# Patient Record
Sex: MALE | Race: WHITE | NOT HISPANIC OR LATINO
[De-identification: names, ages, dates, MRNs, and addresses within clinical notes are randomized per-mention and may not be internally consistent; named-entity substitution may affect disease eponyms.]

---

## 2017-01-27 ENCOUNTER — MEDICATION RENEWAL (OUTPATIENT)
Age: 82
End: 2017-01-27

## 2017-02-23 ENCOUNTER — MEDICATION RENEWAL (OUTPATIENT)
Age: 82
End: 2017-02-23

## 2017-04-22 ENCOUNTER — EMERGENCY (EMERGENCY)
Facility: HOSPITAL | Age: 82
LOS: 1 days | Discharge: HOME | End: 2017-04-22
Attending: EMERGENCY MEDICINE | Admitting: EMERGENCY MEDICINE
Payer: MEDICARE

## 2017-04-22 VITALS
RESPIRATION RATE: 16 BRPM | DIASTOLIC BLOOD PRESSURE: 70 MMHG | OXYGEN SATURATION: 100 % | TEMPERATURE: 98 F | HEART RATE: 81 BPM | SYSTOLIC BLOOD PRESSURE: 140 MMHG

## 2017-04-22 VITALS
DIASTOLIC BLOOD PRESSURE: 97 MMHG | TEMPERATURE: 98 F | OXYGEN SATURATION: 97 % | HEART RATE: 108 BPM | RESPIRATION RATE: 18 BRPM | SYSTOLIC BLOOD PRESSURE: 162 MMHG

## 2017-04-22 DIAGNOSIS — R33.9 RETENTION OF URINE, UNSPECIFIED: ICD-10-CM

## 2017-04-22 LAB
ALBUMIN SERPL ELPH-MCNC: 3.9 G/DL — SIGNIFICANT CHANGE UP (ref 3.3–5)
ALP SERPL-CCNC: 71 U/L — SIGNIFICANT CHANGE UP (ref 40–120)
ALT FLD-CCNC: 9 U/L RC — LOW (ref 10–45)
ANION GAP SERPL CALC-SCNC: 11 MMOL/L — SIGNIFICANT CHANGE UP (ref 5–17)
APPEARANCE UR: CLEAR — SIGNIFICANT CHANGE UP
AST SERPL-CCNC: 17 U/L — SIGNIFICANT CHANGE UP (ref 10–40)
BASOPHILS # BLD AUTO: 0 K/UL — SIGNIFICANT CHANGE UP (ref 0–0.2)
BASOPHILS NFR BLD AUTO: 0.2 % — SIGNIFICANT CHANGE UP (ref 0–2)
BILIRUB SERPL-MCNC: 0.6 MG/DL — SIGNIFICANT CHANGE UP (ref 0.2–1.2)
BILIRUB UR-MCNC: NEGATIVE — SIGNIFICANT CHANGE UP
BUN SERPL-MCNC: 20 MG/DL — SIGNIFICANT CHANGE UP (ref 7–23)
CALCIUM SERPL-MCNC: 9.4 MG/DL — SIGNIFICANT CHANGE UP (ref 8.4–10.5)
CHLORIDE SERPL-SCNC: 94 MMOL/L — LOW (ref 96–108)
CO2 SERPL-SCNC: 26 MMOL/L — SIGNIFICANT CHANGE UP (ref 22–31)
COLOR SPEC: COLORLESS — SIGNIFICANT CHANGE UP
CREAT SERPL-MCNC: 0.87 MG/DL — SIGNIFICANT CHANGE UP (ref 0.5–1.3)
DIFF PNL FLD: ABNORMAL
EOSINOPHIL # BLD AUTO: 0.2 K/UL — SIGNIFICANT CHANGE UP (ref 0–0.5)
EOSINOPHIL NFR BLD AUTO: 3.6 % — SIGNIFICANT CHANGE UP (ref 0–6)
EPI CELLS # UR: SIGNIFICANT CHANGE UP /HPF
GLUCOSE SERPL-MCNC: 100 MG/DL — HIGH (ref 70–99)
GLUCOSE UR QL: NEGATIVE — SIGNIFICANT CHANGE UP
HCT VFR BLD CALC: 34.3 % — LOW (ref 39–50)
HGB BLD-MCNC: 11.2 G/DL — LOW (ref 13–17)
KETONES UR-MCNC: NEGATIVE — SIGNIFICANT CHANGE UP
LEUKOCYTE ESTERASE UR-ACNC: ABNORMAL
LYMPHOCYTES # BLD AUTO: 1.4 K/UL — SIGNIFICANT CHANGE UP (ref 1–3.3)
LYMPHOCYTES # BLD AUTO: 25.2 % — SIGNIFICANT CHANGE UP (ref 13–44)
MCHC RBC-ENTMCNC: 29.5 PG — SIGNIFICANT CHANGE UP (ref 27–34)
MCHC RBC-ENTMCNC: 32.6 GM/DL — SIGNIFICANT CHANGE UP (ref 32–36)
MCV RBC AUTO: 90.5 FL — SIGNIFICANT CHANGE UP (ref 80–100)
MONOCYTES # BLD AUTO: 0.5 K/UL — SIGNIFICANT CHANGE UP (ref 0–0.9)
MONOCYTES NFR BLD AUTO: 8.7 % — SIGNIFICANT CHANGE UP (ref 2–14)
NEUTROPHILS # BLD AUTO: 3.4 K/UL — SIGNIFICANT CHANGE UP (ref 1.8–7.4)
NEUTROPHILS NFR BLD AUTO: 62.3 % — SIGNIFICANT CHANGE UP (ref 43–77)
NITRITE UR-MCNC: NEGATIVE — SIGNIFICANT CHANGE UP
PH UR: 7 — SIGNIFICANT CHANGE UP (ref 5–8)
PLATELET # BLD AUTO: 171 K/UL — SIGNIFICANT CHANGE UP (ref 150–400)
POTASSIUM SERPL-MCNC: 3.8 MMOL/L — SIGNIFICANT CHANGE UP (ref 3.5–5.3)
POTASSIUM SERPL-SCNC: 3.8 MMOL/L — SIGNIFICANT CHANGE UP (ref 3.5–5.3)
PROT SERPL-MCNC: 6.7 G/DL — SIGNIFICANT CHANGE UP (ref 6–8.3)
PROT UR-MCNC: NEGATIVE — SIGNIFICANT CHANGE UP
RBC # BLD: 3.79 M/UL — LOW (ref 4.2–5.8)
RBC # FLD: 11.9 % — SIGNIFICANT CHANGE UP (ref 10.3–14.5)
RBC CASTS # UR COMP ASSIST: SIGNIFICANT CHANGE UP /HPF (ref 0–2)
SODIUM SERPL-SCNC: 131 MMOL/L — LOW (ref 135–145)
SP GR SPEC: 1.01 — LOW (ref 1.01–1.02)
UROBILINOGEN FLD QL: NEGATIVE — SIGNIFICANT CHANGE UP
WBC # BLD: 5.4 K/UL — SIGNIFICANT CHANGE UP (ref 3.8–10.5)
WBC # FLD AUTO: 5.4 K/UL — SIGNIFICANT CHANGE UP (ref 3.8–10.5)
WBC UR QL: SIGNIFICANT CHANGE UP /HPF (ref 0–5)

## 2017-04-22 PROCEDURE — 81001 URINALYSIS AUTO W/SCOPE: CPT

## 2017-04-22 PROCEDURE — 99284 EMERGENCY DEPT VISIT MOD MDM: CPT | Mod: GC

## 2017-04-22 PROCEDURE — 80053 COMPREHEN METABOLIC PANEL: CPT

## 2017-04-22 PROCEDURE — 99284 EMERGENCY DEPT VISIT MOD MDM: CPT | Mod: 25

## 2017-04-22 PROCEDURE — 85027 COMPLETE CBC AUTOMATED: CPT

## 2017-04-22 RX ORDER — SODIUM CHLORIDE 9 MG/ML
500 INJECTION INTRAMUSCULAR; INTRAVENOUS; SUBCUTANEOUS ONCE
Qty: 0 | Refills: 0 | Status: COMPLETED | OUTPATIENT
Start: 2017-04-22 | End: 2017-04-22

## 2017-04-22 RX ADMIN — SODIUM CHLORIDE 500 MILLILITER(S): 9 INJECTION INTRAMUSCULAR; INTRAVENOUS; SUBCUTANEOUS at 06:54

## 2017-04-22 NOTE — ED PROVIDER NOTE - OBJECTIVE STATEMENT
89 year old, history of BPH. PMH reconciled in PMH section, presenting with 1 day of frequent urination. stating he cant stop urinating.     urologist: monique  pmd: azam gurrola 89 year old, history of BPH. PMH reconciled in PMH section, presenting with 1 day of frequent urination. stating he cant stop urinating. denies pain in abdomen    urologist: monique  pmd: azam gurrola 89 year old, history of BPH. PMH reconciled in PMH section, presenting with 1 day of frequent urination. stating he cant stop urinating. denies pain in abdomen. no n/v/d. no fever.    urologist: monique  pmd: azam gurrola

## 2017-04-22 NOTE — ED PROVIDER NOTE - NS ED ROS FT
ROS: No fever/chills, no eye pain, no throat pain, no chest pain, no shortness of breath, constipation, no abdominal pain,  no dysuria, no muscle pain, no rashes, no focal neurologic complaints, no known mental health issues

## 2017-04-22 NOTE — ED ADULT NURSE NOTE - PSH
Adult Hypothyroidism    CAD (Coronary Artery Disease)    Coronary Stent    History of Cholecystectomy    HTN (Hypertension)    S/P Laparoscopic Cholecystectomy

## 2017-04-22 NOTE — ED PROVIDER NOTE - MEDICAL DECISION MAKING DETAILS
89 y F p/w urinary frequency: abd soft, NTND. no cva ttp. Afebrile. UA (+) for ealry UTI will tx w/ po abx. HELEN.

## 2017-04-22 NOTE — ED PROVIDER NOTE - PHYSICAL EXAMINATION
Jayden: A & O x 3, NAD, HEENT WNL and no facial asymmetry; lungs CTAB, heart with reg rhythm without murmur; abdomen with firm suprapubic mass nontender, extremities with no edema; skin with no rashes, neuro exam non focal with no motor or sensory deficits

## 2017-04-22 NOTE — ED ADULT NURSE REASSESSMENT NOTE - NS ED NURSE REASSESS COMMENT FT1
Pt d/c contreras change to leg bag Draining clear yellow urine Denies pain D/c instructions given to family Na 131 family aware to f/u with PMD

## 2017-04-22 NOTE — ED ADULT NURSE REASSESSMENT NOTE - NS ED NURSE REASSESS COMMENT FT1
Received awake alert Hard of hearing Daughters at bedside [place] draining clear yellow urine 1100cc

## 2017-04-22 NOTE — ED ADULT NURSE NOTE - OBJECTIVE STATEMENT
Patient presented to ED ambulatory w/family c/o urinary frequency, patient denies pain and urinary retention, patient is incontinent of urine and very [place]. Md Middleton did an U/S of the bladder and showed bladder full w/urine. Ordered a contreras catheter, inserted by me (Lisa Ireland) w/out difficulty, under strict sterile technique. Contreras catheter drained right away about 600 cc clear light yellow urine.

## 2017-04-22 NOTE — ED ADULT NURSE REASSESSMENT NOTE - NS ED NURSE REASSESS COMMENT FT1
0630 am Patient BP decreased after 1100 cc urine output, Md Middleton made aware, IVF bolus initiated as ordered. Closely monitoring.

## 2017-05-01 ENCOUNTER — MEDICATION RENEWAL (OUTPATIENT)
Age: 82
End: 2017-05-01

## 2017-05-10 ENCOUNTER — APPOINTMENT (OUTPATIENT)
Dept: CARDIOLOGY | Facility: CLINIC | Age: 82
End: 2017-05-10

## 2017-05-10 ENCOUNTER — NON-APPOINTMENT (OUTPATIENT)
Age: 82
End: 2017-05-10

## 2017-05-10 VITALS
TEMPERATURE: 97.5 F | DIASTOLIC BLOOD PRESSURE: 75 MMHG | HEIGHT: 66 IN | BODY MASS INDEX: 22.5 KG/M2 | HEART RATE: 80 BPM | OXYGEN SATURATION: 100 % | SYSTOLIC BLOOD PRESSURE: 136 MMHG | WEIGHT: 140 LBS

## 2017-05-10 DIAGNOSIS — I35.0 NONRHEUMATIC AORTIC (VALVE) STENOSIS: ICD-10-CM

## 2017-05-10 DIAGNOSIS — I25.10 ATHEROSCLEROTIC HEART DISEASE OF NATIVE CORONARY ARTERY W/OUT ANGINA PECTORIS: ICD-10-CM

## 2017-05-10 DIAGNOSIS — I10 ESSENTIAL (PRIMARY) HYPERTENSION: ICD-10-CM

## 2017-05-10 DIAGNOSIS — Z86.19 PERSONAL HISTORY OF OTHER INFECTIOUS AND PARASITIC DISEASES: ICD-10-CM

## 2017-05-10 DIAGNOSIS — E78.00 PURE HYPERCHOLESTEROLEMIA, UNSPECIFIED: ICD-10-CM

## 2017-05-25 ENCOUNTER — MEDICATION RENEWAL (OUTPATIENT)
Age: 82
End: 2017-05-25

## 2017-05-29 ENCOUNTER — RX RENEWAL (OUTPATIENT)
Age: 82
End: 2017-05-29

## 2017-06-19 DIAGNOSIS — R35.0 FREQUENCY OF MICTURITION: ICD-10-CM

## 2017-06-22 ENCOUNTER — INPATIENT (INPATIENT)
Facility: HOSPITAL | Age: 82
LOS: 7 days | Discharge: NURSING, REHAB OR LONG-TERM CARE | DRG: 884 | End: 2017-06-30
Attending: HOSPITALIST | Admitting: HOSPITALIST
Payer: MEDICARE

## 2017-06-22 VITALS
DIASTOLIC BLOOD PRESSURE: 65 MMHG | TEMPERATURE: 98 F | RESPIRATION RATE: 16 BRPM | SYSTOLIC BLOOD PRESSURE: 111 MMHG | HEART RATE: 110 BPM | OXYGEN SATURATION: 100 %

## 2017-06-22 DIAGNOSIS — R31.9 HEMATURIA, UNSPECIFIED: ICD-10-CM

## 2017-06-22 DIAGNOSIS — E03.9 HYPOTHYROIDISM, UNSPECIFIED: ICD-10-CM

## 2017-06-22 DIAGNOSIS — R41.0 DISORIENTATION, UNSPECIFIED: ICD-10-CM

## 2017-06-22 DIAGNOSIS — Z29.9 ENCOUNTER FOR PROPHYLACTIC MEASURES, UNSPECIFIED: ICD-10-CM

## 2017-06-22 DIAGNOSIS — N40.0 BENIGN PROSTATIC HYPERPLASIA WITHOUT LOWER URINARY TRACT SYMPTOMS: ICD-10-CM

## 2017-06-22 DIAGNOSIS — I10 ESSENTIAL (PRIMARY) HYPERTENSION: ICD-10-CM

## 2017-06-22 DIAGNOSIS — F03.90 UNSPECIFIED DEMENTIA, UNSPECIFIED SEVERITY, WITHOUT BEHAVIORAL DISTURBANCE, PSYCHOTIC DISTURBANCE, MOOD DISTURBANCE, AND ANXIETY: ICD-10-CM

## 2017-06-22 DIAGNOSIS — I35.0 NONRHEUMATIC AORTIC (VALVE) STENOSIS: ICD-10-CM

## 2017-06-22 DIAGNOSIS — T83.511D INFECTION AND INFLAMMATORY REACTION DUE TO INDWELLING URETHRAL CATHETER, SUBSEQUENT ENCOUNTER: ICD-10-CM

## 2017-06-22 LAB
ALBUMIN SERPL ELPH-MCNC: 3.5 G/DL — SIGNIFICANT CHANGE UP (ref 3.3–5)
ALP SERPL-CCNC: 55 U/L — SIGNIFICANT CHANGE UP (ref 40–120)
ALT FLD-CCNC: 9 U/L RC — LOW (ref 10–45)
ANION GAP SERPL CALC-SCNC: 12 MMOL/L — SIGNIFICANT CHANGE UP (ref 5–17)
APPEARANCE UR: ABNORMAL
AST SERPL-CCNC: 18 U/L — SIGNIFICANT CHANGE UP (ref 10–40)
BASOPHILS # BLD AUTO: 0 K/UL — SIGNIFICANT CHANGE UP (ref 0–0.2)
BASOPHILS NFR BLD AUTO: 0.5 % — SIGNIFICANT CHANGE UP (ref 0–2)
BILIRUB SERPL-MCNC: 0.7 MG/DL — SIGNIFICANT CHANGE UP (ref 0.2–1.2)
BILIRUB UR-MCNC: NEGATIVE — SIGNIFICANT CHANGE UP
BUN SERPL-MCNC: 17 MG/DL — SIGNIFICANT CHANGE UP (ref 7–23)
CALCIUM SERPL-MCNC: 9.7 MG/DL — SIGNIFICANT CHANGE UP (ref 8.4–10.5)
CHLORIDE SERPL-SCNC: 95 MMOL/L — LOW (ref 96–108)
CO2 SERPL-SCNC: 26 MMOL/L — SIGNIFICANT CHANGE UP (ref 22–31)
COLOR SPEC: ABNORMAL
CREAT SERPL-MCNC: 0.91 MG/DL — SIGNIFICANT CHANGE UP (ref 0.5–1.3)
DIFF PNL FLD: ABNORMAL
EOSINOPHIL # BLD AUTO: 0.1 K/UL — SIGNIFICANT CHANGE UP (ref 0–0.5)
EOSINOPHIL NFR BLD AUTO: 1.4 % — SIGNIFICANT CHANGE UP (ref 0–6)
GAS PNL BLDV: SIGNIFICANT CHANGE UP
GLUCOSE SERPL-MCNC: 136 MG/DL — HIGH (ref 70–99)
GLUCOSE UR QL: NEGATIVE — SIGNIFICANT CHANGE UP
HCT VFR BLD CALC: 32.1 % — LOW (ref 39–50)
HGB BLD-MCNC: 11.2 G/DL — LOW (ref 13–17)
KETONES UR-MCNC: NEGATIVE — SIGNIFICANT CHANGE UP
LEUKOCYTE ESTERASE UR-ACNC: ABNORMAL
LYMPHOCYTES # BLD AUTO: 1.4 K/UL — SIGNIFICANT CHANGE UP (ref 1–3.3)
LYMPHOCYTES # BLD AUTO: 21.6 % — SIGNIFICANT CHANGE UP (ref 13–44)
MAGNESIUM SERPL-MCNC: 2.1 MG/DL — SIGNIFICANT CHANGE UP (ref 1.6–2.6)
MCHC RBC-ENTMCNC: 33 PG — SIGNIFICANT CHANGE UP (ref 27–34)
MCHC RBC-ENTMCNC: 35 GM/DL — SIGNIFICANT CHANGE UP (ref 32–36)
MCV RBC AUTO: 94.3 FL — SIGNIFICANT CHANGE UP (ref 80–100)
MONOCYTES # BLD AUTO: 0.4 K/UL — SIGNIFICANT CHANGE UP (ref 0–0.9)
MONOCYTES NFR BLD AUTO: 6.4 % — SIGNIFICANT CHANGE UP (ref 2–14)
NEUTROPHILS # BLD AUTO: 4.6 K/UL — SIGNIFICANT CHANGE UP (ref 1.8–7.4)
NEUTROPHILS NFR BLD AUTO: 70.1 % — SIGNIFICANT CHANGE UP (ref 43–77)
NITRITE UR-MCNC: NEGATIVE — SIGNIFICANT CHANGE UP
PH UR: 7.5 — SIGNIFICANT CHANGE UP (ref 5–8)
PHOSPHATE SERPL-MCNC: 3.2 MG/DL — SIGNIFICANT CHANGE UP (ref 2.5–4.5)
PLATELET # BLD AUTO: 145 K/UL — LOW (ref 150–400)
POTASSIUM SERPL-MCNC: 4.2 MMOL/L — SIGNIFICANT CHANGE UP (ref 3.5–5.3)
POTASSIUM SERPL-SCNC: 4.2 MMOL/L — SIGNIFICANT CHANGE UP (ref 3.5–5.3)
PROT SERPL-MCNC: 6.6 G/DL — SIGNIFICANT CHANGE UP (ref 6–8.3)
PROT UR-MCNC: 300 MG/DL
RBC # BLD: 3.41 M/UL — LOW (ref 4.2–5.8)
RBC # FLD: 12.5 % — SIGNIFICANT CHANGE UP (ref 10.3–14.5)
RBC CASTS # UR COMP ASSIST: >50 /HPF (ref 0–2)
SODIUM SERPL-SCNC: 133 MMOL/L — LOW (ref 135–145)
SP GR SPEC: 1.01 — SIGNIFICANT CHANGE UP (ref 1.01–1.02)
UROBILINOGEN FLD QL: NEGATIVE — SIGNIFICANT CHANGE UP
WBC # BLD: 6.5 K/UL — SIGNIFICANT CHANGE UP (ref 3.8–10.5)
WBC # FLD AUTO: 6.5 K/UL — SIGNIFICANT CHANGE UP (ref 3.8–10.5)
WBC UR QL: SIGNIFICANT CHANGE UP /HPF (ref 0–5)

## 2017-06-22 PROCEDURE — 99285 EMERGENCY DEPT VISIT HI MDM: CPT | Mod: GC

## 2017-06-22 PROCEDURE — 93010 ELECTROCARDIOGRAM REPORT: CPT

## 2017-06-22 PROCEDURE — 71010: CPT | Mod: 26

## 2017-06-22 PROCEDURE — 70450 CT HEAD/BRAIN W/O DYE: CPT | Mod: 26

## 2017-06-22 PROCEDURE — 99222 1ST HOSP IP/OBS MODERATE 55: CPT | Mod: GC

## 2017-06-22 PROCEDURE — 99223 1ST HOSP IP/OBS HIGH 75: CPT | Mod: GC

## 2017-06-22 RX ORDER — FINASTERIDE 5 MG/1
1 TABLET, FILM COATED ORAL
Qty: 0 | Refills: 0 | COMMUNITY

## 2017-06-22 RX ORDER — CEFTRIAXONE 500 MG/1
1 INJECTION, POWDER, FOR SOLUTION INTRAMUSCULAR; INTRAVENOUS ONCE
Qty: 0 | Refills: 0 | Status: COMPLETED | OUTPATIENT
Start: 2017-06-22 | End: 2017-06-22

## 2017-06-22 RX ORDER — LEVOTHYROXINE SODIUM 125 MCG
75 TABLET ORAL DAILY
Qty: 0 | Refills: 0 | Status: DISCONTINUED | OUTPATIENT
Start: 2017-06-22 | End: 2017-06-30

## 2017-06-22 RX ORDER — TAMSULOSIN HYDROCHLORIDE 0.4 MG/1
0.4 CAPSULE ORAL AT BEDTIME
Qty: 0 | Refills: 0 | Status: DISCONTINUED | OUTPATIENT
Start: 2017-06-22 | End: 2017-06-30

## 2017-06-22 RX ORDER — HALOPERIDOL DECANOATE 100 MG/ML
0.25 INJECTION INTRAMUSCULAR
Qty: 0 | Refills: 0 | Status: DISCONTINUED | OUTPATIENT
Start: 2017-06-22 | End: 2017-06-30

## 2017-06-22 RX ORDER — HALOPERIDOL DECANOATE 100 MG/ML
0.12 INJECTION INTRAMUSCULAR ONCE
Qty: 0 | Refills: 0 | Status: COMPLETED | OUTPATIENT
Start: 2017-06-22 | End: 2017-06-22

## 2017-06-22 RX ORDER — ASPIRIN/CALCIUM CARB/MAGNESIUM 324 MG
81 TABLET ORAL DAILY
Qty: 0 | Refills: 0 | Status: DISCONTINUED | OUTPATIENT
Start: 2017-06-22 | End: 2017-06-22

## 2017-06-22 RX ORDER — TAMSULOSIN HYDROCHLORIDE 0.4 MG/1
1 CAPSULE ORAL
Qty: 0 | Refills: 0 | COMMUNITY

## 2017-06-22 RX ORDER — HALOPERIDOL DECANOATE 100 MG/ML
0.12 INJECTION INTRAMUSCULAR EVERY 6 HOURS
Qty: 0 | Refills: 0 | Status: DISCONTINUED | OUTPATIENT
Start: 2017-06-22 | End: 2017-06-22

## 2017-06-22 RX ORDER — SIMVASTATIN 20 MG/1
20 TABLET, FILM COATED ORAL AT BEDTIME
Qty: 0 | Refills: 0 | Status: DISCONTINUED | OUTPATIENT
Start: 2017-06-22 | End: 2017-06-30

## 2017-06-22 RX ORDER — ASPIRIN/CALCIUM CARB/MAGNESIUM 324 MG
81 TABLET ORAL DAILY
Qty: 0 | Refills: 0 | Status: DISCONTINUED | OUTPATIENT
Start: 2017-06-22 | End: 2017-06-30

## 2017-06-22 RX ORDER — LEVOTHYROXINE SODIUM 125 MCG
1 TABLET ORAL
Qty: 0 | Refills: 0 | COMMUNITY

## 2017-06-22 RX ORDER — FINASTERIDE 5 MG/1
5 TABLET, FILM COATED ORAL DAILY
Qty: 0 | Refills: 0 | Status: DISCONTINUED | OUTPATIENT
Start: 2017-06-22 | End: 2017-06-30

## 2017-06-22 RX ORDER — CEFTRIAXONE 500 MG/1
1 INJECTION, POWDER, FOR SOLUTION INTRAMUSCULAR; INTRAVENOUS EVERY 24 HOURS
Qty: 0 | Refills: 0 | Status: DISCONTINUED | OUTPATIENT
Start: 2017-06-22 | End: 2017-06-24

## 2017-06-22 RX ORDER — ASPIRIN/CALCIUM CARB/MAGNESIUM 324 MG
1 TABLET ORAL
Qty: 0 | Refills: 0 | COMMUNITY

## 2017-06-22 RX ORDER — HALOPERIDOL DECANOATE 100 MG/ML
0.12 INJECTION INTRAMUSCULAR EVERY 6 HOURS
Qty: 0 | Refills: 0 | Status: DISCONTINUED | OUTPATIENT
Start: 2017-06-22 | End: 2017-06-27

## 2017-06-22 RX ADMIN — SIMVASTATIN 20 MILLIGRAM(S): 20 TABLET, FILM COATED ORAL at 20:57

## 2017-06-22 RX ADMIN — TAMSULOSIN HYDROCHLORIDE 0.4 MILLIGRAM(S): 0.4 CAPSULE ORAL at 20:57

## 2017-06-22 RX ADMIN — HALOPERIDOL DECANOATE 0.12 MILLIGRAM(S): 100 INJECTION INTRAMUSCULAR at 20:50

## 2017-06-22 RX ADMIN — HALOPERIDOL DECANOATE 0.12 MILLIGRAM(S): 100 INJECTION INTRAMUSCULAR at 19:06

## 2017-06-22 RX ADMIN — CEFTRIAXONE 100 GRAM(S): 500 INJECTION, POWDER, FOR SOLUTION INTRAMUSCULAR; INTRAVENOUS at 13:10

## 2017-06-22 RX ADMIN — Medication 81 MILLIGRAM(S): at 20:58

## 2017-06-22 NOTE — BEHAVIORAL HEALTH ASSESSMENT NOTE - NSBHCHARTREVIEWIMAGING_PSY_A_CORE FT
EXAM:  CT BRAIN                          PROCEDURE DATE:  06/22/2017      INTERPRETATION:  CLINICAL INFORMATION: Altered mental status    TECHNIQUE: Noncontrast axial CT images were acquired through the head.   Two-dimensional sagittal and coronal reformats were generated.    COMPARISON STUDY: CT brain dated June 6, 2011.    FINDINGS:     There is no CT evidence of acute intracranial hemorrhage, extra-axial   collection, vasogenic edema, mass effect, midline shift, central   herniation,or hydrocephalus.    Prominent ventricles and sulci secondary to parenchymal volume loss. The   ventricles and sulci have increased since the prior examination 6 years   earlier. Scattered white matter hypodensity compatible with chronic   microvascular-type change.    The visualized paranasal sinuses are clear. The mastoid air cells and   middle ear cavities are grossly clear.    The soft tissues of the scalp and face are unremarkable.    The bones of the calvarium, skull base, and face are unremarkable.    There has been bilateral cataract surgery.    IMPRESSION:     No CT evidence of acute intracranial hemorrhage or mass effect. Atrophy   and small vessel white matter ischemic changes. Atrophy has moderately   progressed since 6/6/2011.

## 2017-06-22 NOTE — BEHAVIORAL HEALTH ASSESSMENT NOTE - NSBHCHARTREVIEWLAB_PSY_A_CORE FT
CBC Full  -  ( 2017 09:15 )  WBC Count : 6.5 K/uL  Hemoglobin : 11.2 g/dL  Hematocrit : 32.1 %  Platelet Count - Automated : 145 K/uL  Mean Cell Volume : 94.3 fl  Mean Cell Hemoglobin : 33.0 pg  Mean Cell Hemoglobin Concentration : 35.0 gm/dL  Auto Neutrophil # : 4.6 K/uL  Auto Lymphocyte # : 1.4 K/uL  Auto Monocyte # : 0.4 K/uL  Auto Eosinophil # : 0.1 K/uL  Auto Basophil # : 0.0 K/uL  Auto Neutrophil % : 70.1 %  Auto Lymphocyte % : 21.6 %  Auto Monocyte % : 6.4 %  Auto Eosinophil % : 1.4 %  Auto Basophil % : 0.5 %        133<L>  |  95<L>  |  17  ----------------------------<  136<H>  4.2   |  26  |  0.91    Ca    9.7      2017 09:15  Phos  3.2       Mg     2.1         TPro  6.6  /  Alb  3.5  /  TBili  0.7  /  DBili  x   /  AST  18  /  ALT  9<L>  /  AlkPhos  55      Urinalysis Basic - ( 2017 11:37 )    Color: Red / Appearance: Turbid / S.010 / pH: x  Gluc: x / Ketone: Negative  / Bili: Negative / Urobili: Negative   Blood: x / Protein: 300 mg/dL / Nitrite: Negative   Leuk Esterase: Small / RBC: >50 /HPF / WBC 11-25 /HPF   Sq Epi: x / Non Sq Epi: x / Bacteria: x

## 2017-06-22 NOTE — ED PROVIDER NOTE - ATTENDING CONTRIBUTION TO CARE
89 y/o m with pmhx AS, dementia, presents for concern of rapid decline in MS. lost 9 lbs over 8 weeks and last night pulled his contreras cath out. family takes turns caring for him. no fever no chills. not eating as much.   Gen. no acute distress, Non toxic   HEENT: EOMI, mmm,   Lungs: CTAB/L no C/ W /R   CVS: S1S2   Abd; Soft non tender, non distended + periumbilical hernia. reducible and non tender.  Ext: no edema    dried blood at glans penis. no tenderness. no clots or lesions  Neuro AAOx3 non focal clear speech

## 2017-06-22 NOTE — ED PROVIDER NOTE - OBJECTIVE STATEMENT
89 y/o male with 89 y/o male with PMH of aortic stenosis, mild dementia recently placed contreras on 6/20 by Dr. Lynn pulled out his contreras this morning. Patient had it placed 2/2 to BPH. Patient is on flomax. Denies any fevers, chills, chest pain, SOB, N/V/D. At baseline, patient is able to walk with a walker. Patient lives at home with daughter. Family is concerned that "something else is going on" b/c they feel that he has become more confused recently (over past few days).  Also report a 10 lb weight loss recently.     PMD: Dr. Gorman 89 y/o male with PMH of aortic stenosis, mild dementia recently placed contreras on 6/20 by Dr. Lynn pulled out his contreras this morning. Patient had it placed 2/2 to BPH. Patient is on flomax. Denies any fevers, chills, chest pain, SOB, N/V/D. At baseline, patient is able to walk with a walker. Patient lives at home with daughter. Family is concerned that "something else is going on" b/c they feel that he has become more confused and forgetful recently.  Also report a 10 lb weight loss recently. Family states that this all occurred over past week or so.    PMD: Dr. Gorman

## 2017-06-22 NOTE — BEHAVIORAL HEALTH ASSESSMENT NOTE - CASE SUMMARY
This is a 89-y.o. CM pt. with multiple medical co-morbidities and unspecified dementia, who came in with AMS in the context of UTI, his presentation indicative of delirium. I agree with fellow's assessment and plan.

## 2017-06-22 NOTE — ED ADULT NURSE NOTE - OBJECTIVE STATEMENT
89 yo presents to the ED from home reports that he pulled his contreras catheter out this morning. pt family states that he had a contreras catheter placed 2 days ago by urologist due to enlarged prostate. pt takes flomax daily. pt denies fever, chills, N/V/D, SOB, cp. pt has history of UTI. at baseline, pt lives at home with daughter, able to walk with a walker. pt family states he has onset of dementia. pt family states that it has been worse the past few days. pt family states that in the past 9 weeks he lost 10 pounds. 89 yo presents to the ED from home reports that he pulled his contreras catheter out this morning. pt family states that he had a contreras catheter placed 2 days ago by urologist due to enlarged prostate. pt takes flomax daily. pt denies fever, chills, N/V/D, SOB, cp. pt has history of UTI. at baseline, pt lives at home with daughter, able to walk with a walker. pt family states he has onset of dementia. pt family states that it has been worse the past few days. pt family states that in the past 9 weeks he lost 10 pounds. family states that there is a "safety concern with him being home since he needs 24 hour care that they can not provide". pt family states that pt has been stating that he wants to die the past few weeks. pt has red blanchable sore on his buttock area. safety and comfort measure maintained. 89 yo presents to the ED from home reports that he pulled his contreras catheter out this morning. pt family states that he had a contreras catheter placed 2 days ago by urologist due to enlarged prostate. pt takes flomax daily. pt denies fever, chills, N/V/D, SOB, cp. pt has history of UTI. at baseline, pt lives at home with daughter, able to walk with a walker. pt family states he has recent onset of dementia with altered mental status the past few weeks. pt family states that it has been worse the past few days. pt family states that in the past 9 weeks he lost 10 pounds. family states that there is a "safety concern with him being home since he needs 24 hour care that they can not provide". pt family states that pt has been stating that he wants to die the past few weeks. pt has red blanchable sore on his buttock area. pt appears comfortable, NAD noted. safety and comfort measure maintained.

## 2017-06-22 NOTE — H&P ADULT - HISTORY OF PRESENT ILLNESS
89 M symptomatic AS (not operative candidate), HTN, HLD, hypothyroid, GERD, BPH, dementia, and prior UTI, p/w acute agitation and hematuria s/p pulling contreras out.  Pt lives with daughter Tiffanie and at baseline has dementia, with noted gradual decline over last few months.  About 1 month ago, had urinary sx, seen in ED for retention, had contreras placed and f/u with Uro, dx with UTI, and rxed bactrim, to which pt became "loopy" on.  After tx, doing ok, until about 2 days ago, where he had contreras cath placed by Uro for BPH/retention.  During this time, pt noted to have acute agitation above baseline; this morning found with contreras cath removed and blood at meatus.  At baseline, pt has had progressive decline of cognition over last few months, with sx starting few months after his wife passed away in 2009.  Still ambulatory with walker, lives with daughter, and is cared for equally by 3 siblings. 89 M symptomatic AS (not operative candidate), HTN, HLD, hypothyroid, GERD, BPH, dementia, and prior UTI, p/w acute agitation and hematuria s/p pulling contreras out.  Pt lives with daughter Tiffanie and at baseline has dementia, with noted gradual decline over last few months.  About 1 month ago, had urinary sx, seen in ED for retention, had contreras placed and f/u with Uro, dx with UTI, and rxed bactrim, to which pt became "loopy" on. Pt also had episode of diarrhea x few days, resolved since last 2-3 days s/p immodium.  After tx, doing ok, until about 2 days ago, where he had contreras cath placed by Uro for BPH/retention.  During this time, pt noted to have acute agitation above baseline; this morning found with contreras cath removed and blood at meatus.   Noted to have weight loss over last few months, no night sweats.  Denies CP/SOB/f/c, denies abd pain, denies back pain, unclear if having urinary sx.   At baseline, pt has had progressive decline of cognition over last few months, with sx starting few months after his wife passed away in 2009.  Still ambulatory with walker, lives with daughter, and is cared for equally by 3 siblings. Extensive GOC d/w family in ER; pt has endorsed in will in the past that he wants to be DNR/DNI; family has dutifully taken care of him at home but believes that after this current hospitalization, they may need to place pt in nursing home.      In ED: VS: 97.5, 110, 111/65, 16, 100% RA.  Labs: no leukocytosis, chronic stable anemia (hgb 11.2), chronic stable mild hyponatremia (133), UA+.  CTH without acute changes but atrophy and white matter ischemic disease seen, progressed since 2011. CXR with clear lungs. Given ceftriaxone in ED. 89 M symptomatic AS (not operative candidate), HTN, HLD, hypothyroid, GERD, BPH, dementia, and prior UTI, p/w acute agitation and hematuria s/p pulling contreras out.  Pt lives with daughter Tiffanie and at baseline has dementia, with noted gradual decline over last few months.  About 1 month ago, had urinary sx, seen in ED for retention, had contreras placed and f/u with Uro, dx with UTI, and rxed bactrim, to which pt became "loopy" on. Pt also had episode of diarrhea x few days, resolved since last 2-3 days s/p immodium.  After tx, doing ok, until about 2 days ago, where he had contreras cath placed by Uro for BPH/retention.  During this time, pt noted to have acute agitation above baseline; this morning found with contreras cath removed and blood at meatus.   Noted to have weight loss over last few months, no night sweats.  Denies CP/SOB/f/c, denies abd pain, denies back pain, unclear if having urinary sx.   At baseline, pt has had progressive decline of cognition over last few months, with sx starting few months after his wife passed away in 2009.  Still ambulatory with walker, lives with daughter, and is cared for equally by 3 siblings. Extensive GOC d/w family in ER; pt has endorsed in will in the past that he wants to be DNR/DNI; family has dutifully taken care of him at home but believes that after this current hospitalization, they may need to place pt in nursing home.      In ED: VS: 97.5, 110, 111/65, 16, 100% RA.  Labs: no leukocytosis, chronic stable anemia (hgb 11.2), chronic stable mild hyponatremia (133), UA+.  CTH without acute changes but atrophy and white matter ischemic disease seen, progressed since 2011. CXR with clear lungs. Given ceftriaxone in ED.  Seen by uro, contreras re-placed in ER, 400 cc red urine out. Pt endorsing wish to "kill himself" 2/2 hospitalization and issues with contreras; seen by behavioral health in ED; noted with No prior psychiatric history, no h/o suicidality. No h/o substance abuse, behavior thought 2/2 delirium in setting of UTI. 89M w/ PMHx of symptomatic AS (not operative candidate), HTN, HLD, Hypothyroid, GERD, BPH, Dementia (baseline MS ____), and prior UTI, p/w acute agitation and hematuria s/p pulling contreras out.  Pt lives with daughter Tiffanie and at baseline has dementia, with noted gradual decline over last few months.  About 1 month ago, had urinary sx, seen in ED for retention, had Contreras placed and f/u with Uro, dx with UTI, and treated with Bactrim, to which pt became "loopy" on. Pt also had episode of diarrhea x few days, resolved since last 2-3 days s/p immodium.  After tx, doing ok, until about 2 days ago, where he had Contreras cath placed by Uro for BPH/retention.  During this time, pt noted to have acute agitation above baseline; this morning found with contreras cath removed and blood at meatus.   Noted to have weight loss over last few months, no night sweats.  Denies CP/SOB/f/c, denies abd pain, denies back pain, unclear if having urinary sx.          At baseline, pt has had progressive decline of cognition over last few months, with sx starting few months after his wife passed away in 2009.  Still ambulatory with walker, lives with daughter, and is cared for equally by 3 siblings. Extensive GOC d/w family in ER; pt has endorsed in will in the past that he wants to be DNR/DNI; family has dutifully taken care of him at home but believes that after this current hospitalization, they may need to place pt in nursing home.      ED Vitals: 97.5, 110, 111/65, 16, 100% RA.     Labs: no leukocytosis, chronic stable anemia (hgb 11.2), chronic stable mild hyponatremia (133), UA+.  CTH without acute changes but atrophy and white matter ischemic disease seen, progressed since 2011. CXR with clear lungs. Given ceftriaxone in ED.  Seen by Urology, Contreras re-placed in ER, 400 cc red urine out. Pt endorsing wish to "kill himself" 2/2 hospitalization and issues with contreras; seen by Behavioral Health in ED; noted with no prior psychiatric history, no h/o suicidality. No h/o substance abuse, behavior thought 2/2 delirium in setting of UTI. 89M w/ PMHx of symptomatic AS (not operative candidate), HTN, HLD, CAD (s/p PCI), Hypothyroid, GERD, BPH, Dementia (baseline MS unknown), and prior UTI, p/w acute agitation and hematuria s/p pulling contreras out.  Pt lives with daughter Tiffanie and at baseline has dementia, with noted gradual decline over last few months.  About 1 month ago, had urinary sx, seen in ED for retention, had Contreras placed and f/u with Uro, dx with UTI, and treated with Bactrim, to which pt became "loopy" on. Pt also had episode of diarrhea x few days, resolved since last 2-3 days s/p immodium.  After tx, doing ok, until about 2 days ago, where he had Contreras cath placed by Uro for BPH/retention.  During this time, pt noted to have acute agitation above baseline; this morning found with contreras cath removed and blood at meatus.   Noted to have weight loss over last few months, no night sweats.  Denies CP/SOB/f/c, denies abd pain, denies back pain, unclear if having urinary sx.          At baseline, pt has had progressive decline of cognition over last few months, with sx starting few months after his wife passed away in 2009.  Still ambulatory with walker, lives with daughter, and is cared for equally by 3 siblings. Extensive GOC d/w family in ER; pt has endorsed in will in the past that he wants to be DNR/DNI; family has dutifully taken care of him at home but believes that after this current hospitalization, they may need to place pt in nursing home.      ED Vitals: 97.5, 110, 111/65, 16, 100% RA.     Labs: no leukocytosis, chronic stable anemia (hgb 11.2), chronic stable mild hyponatremia (133), UA+.  CTH without acute changes but atrophy and white matter ischemic disease seen, progressed since 2011. CXR with clear lungs. Given ceftriaxone in ED.  Seen by Urology, Contreras re-placed in ER, 400 cc red urine out. Pt endorsing wish to "kill himself" 2/2 hospitalization and issues with contreras; seen by Behavioral Health in ED; noted with no prior psychiatric history, no h/o suicidality. No h/o substance abuse, behavior thought 2/2 delirium in setting of UTI. 89M w/ PMHx of symptomatic AS (not operative candidate), HTN, HLD, CAD (s/p PCI), Hypothyroid, GERD, BPH, Dementia (baseline MS unknown), and prior UTI, p/w acute agitation and hematuria s/p pulling contreras out.  Pt lives with daughter Tiffanie and at baseline has dementia, with noted gradual decline over last few months.  About 1 month ago, had urinary sx, seen in ED for retention, had Contreras placed and f/u with Uro, dx with UTI, and treated with Bactrim, to which pt became "loopy" on. Pt also had episode of diarrhea x few days, resolved since last 2-3 days s/p immodium.  After tx, doing ok, until about 2 days ago, where he had Contreras cath placed by Uro for BPH/retention.  During this time, pt noted to have acute agitation above baseline; this morning found with contreras cath removed and blood at meatus.   Noted to have weight loss over last few months, no night sweats.  Denies CP/SOB/f/c, denies abd pain, denies back pain, unclear if having urinary sx.          At baseline, pt has had progressive decline of cognition over last few months, with sx starting few months after his wife passed away in 2009.  Still ambulatory with walker, lives with daughter, and is cared for equally by 3 siblings. Extensive GOC d/w family in ER; pt has endorsed in will in the past that he wants to be DNR/DNI; family has dutifully taken care of him at home but believes that after this current hospitalization, they may need to place pt in nursing home.      ED Vitals: 97.5, 110, 111/65, 16, 100% RA.     Labs: no leukocytosis, chronic stable anemia (hgb 11.2), chronic stable mild hyponatremia (133), UA+.  CTH without acute changes but atrophy and white matter ischemic disease seen, progressed since 2011. CXR with clear lungs. Given ceftriaxone in ED.  Seen by Urology, Contreras re-placed in ER, 400 cc red urine out. Pt endorsing wish to "kill himself" 2/2 hospitalization and issues with contreras; seen by Behavioral Health in ED; noted with no prior psychiatric history, no h/o suicidality. No h/o substance abuse, behavior thought 2/2 delirium in setting of UTI.     Note: Med rec performed by me

## 2017-06-22 NOTE — CHART NOTE - NSCHARTNOTEFT_GEN_A_CORE
Medicine Night Float: Event Note    Event: agitation. Provider contacted for ongoing agitation after Haldol 0.125mg x 2 earlier this evening, last dose 20:00. I discussed the case with the admitting attending earlier this evening with plan to call psychiatry if pt is inadequately controlled. I called to discuss the case with psychiatry resident on call who recommended giving up to Haldol 1mg overnight.     Assessment/Plan: Give next prn Haldol 0.125 mg dose early now. This will be a total of 0.375mg Haldol. If ongoing agitation, will give additional doses. Pt is scheduled for 0.25 mg po dose in the AM.     T(C): 37.1, Max: 37.1 (06-22 @ 21:00)  T(F): 98.8  HR: 94 (52 - 110)  BP: 127/85 (98/55 - 153/90)  BP(mean): --  RR: 16 (15 - 18)  SpO2: 97% (97% - 100%)  Wt(kg): --    Malachi Petty  R1, Medicine, Night Float  144

## 2017-06-22 NOTE — H&P ADULT - PROBLEM SELECTOR PLAN 4
-progressive decline  -GOC in ER, proxy Tiffanie has livia DNR/DNI  -will sign out to day team to f/u with ; family amenable to placement in long term facility upon d/c -c/w flomax, finasteride  -c/w ben, urology following -c/w Flomax and Finasteride  -c/w Sheyla, Urology following

## 2017-06-22 NOTE — H&P ADULT - PROBLEM SELECTOR PLAN 6
-c/w synthroid -no s/s of overt heart failure at this time, no significant edema on exam  -pt no longer on diuretics as outpatient  -Pt off of all antihypertensives or mark agents as o/p 2/2 soft pressures  -not candidate for surgery  -currently SBP <120 so no need for diuretics or ace-i -no s/s of overt heart failure at this time, no significant edema on exam  -pt no longer on diuretics as outpatient  -Pt off of all antihypertensives or mark agents as o/p 2/2 soft pressures  -not candidate for surgery  -currently SBP <120 so no need for diuretics or ace-i  -pt on aspirin and statin, presumable for cad; would d/c both given age and risk/benefit of both -no s/s of overt heart failure at this time, no significant edema on exam  -pt no longer on diuretics as outpatient  -not candidate for surgery  -currently SBP <120 so no need for diuretics or ace-i  -pt on ASA and statin, would d/w outpatient Cardiology risks vs benefits of continuing both - given h/o CAD w/ PCI will continue both for now

## 2017-06-22 NOTE — H&P ADULT - NSHPLABSRESULTS_GEN_ALL_CORE
Labs reviewed by me:  no leukocytosis, chronic stable anemia (hgb 11.2), chronic stable mild hyponatremia (133), UA+.     Imaging reviewed by me,  CTH without acute changes but atrophy and white matter ischemic disease seen, progressed since 2011. CXR with clear lungs. Labs reviewed by me:  no leukocytosis, chronic stable anemia (hgb 11.2 @ baseline), chronic stable mild hyponatremia (133), UA+ (WBC:11-25, LE:small, Nitrite:neg), UCx pending    Imaging: Personally reviewed: CTH without acute changes but atrophy and white matter ischemic disease seen, progressed since 2011. CXR: clear lungs.    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

## 2017-06-22 NOTE — H&P ADULT - PROBLEM SELECTOR PLAN 5
-no s/s of overt heart failure at this time, no significant edema on exam  -pt no longer on diuretics as outpatient  -Pt off of all antihypertensives or mark agents as o/p 2/2 soft pressures  -not candidate for surgery -no s/s of overt heart failure at this time, no significant edema on exam  -pt no longer on diuretics as outpatient  -Pt off of all antihypertensives or mark agents as o/p 2/2 soft pressures  -not candidate for surgery  -currently SBP <120 so no need for diuretics or ace-i -progressive decline  -GOC in ER, proxy Tiffanie has livia DNR/DNI  -will sign out to day team to f/u with ; family amenable to placement in long term facility upon d/c -check TSH, B12, HIV, RPR in am  -GOC in ER, proxy Tiffanie has signed DNR/DNI  -will sign out to day team to f/u with ; family amenable to placement in long term facility upon d/c

## 2017-06-22 NOTE — BEHAVIORAL HEALTH ASSESSMENT NOTE - HPI (INCLUDE ILLNESS QUALITY, SEVERITY, DURATION, TIMING, CONTEXT, MODIFYING FACTORS, ASSOCIATED SIGNS AND SYMPTOMS)
Patient is a 89 y/o with multiple medical co-morbidities and unspecified dementia, who came in with AMS and pulling out Carrion's.  Patient seen at bedside, orientedx1, he cannot respond to most questions just repeating "I am tired", "I want to go home". Family at bedside reports he cannot hear without hearing aid and he refuses to wear it.   As per the 3 children at bedside, he has been having some mild dementia for a while, worsened over 6 months. He was very confused for past couple of days, is more agitated and difficult to manage. Son found him after he had pulled out his Carrion's last night.  No prior psychiatric history, no h/o suicidality. No h/o substance abuse.  h/o Aortic stenosis, UTI  Retired [place] , , 5 children, lives with one of the daughters  No legal history  No significant family history

## 2017-06-22 NOTE — ED PROVIDER NOTE - CONSTITUTIONAL, MLM
normal... Well appearing, well nourished, awake, alert, oriented to person, place. Hard of hearing. Thin but does not appear toxic

## 2017-06-22 NOTE — BEHAVIORAL HEALTH ASSESSMENT NOTE - SUMMARY
89 y/o male with PMH of aortic stenosis, mild dementia recently placed contreras on 6/20 by Dr. Lynn pulled out his contreras this morning. Patient had it placed 2/2 to BPH. Patient is on flomax. Denies any fevers, chills, chest pain, SOB, N/V/D. At baseline, patient is able to walk with a walker. Patient lives at home with daughter.  Prince has some underlying dementia per history and now delirious secondary to UTI. No h/o SI/HI/hallucinations or any psychiatric issues in the past. Patient randomly saying "I want to kill myself" for past couple of days, likely due to AMS secondary to delirium.    Family reports he is more difficult to manage lately and they would like to place him in a facility.  Discussed the options of medications for treatment of delirium, counseled regarding risks and benefits including black box warning for increased risk of mortality with antipsychotics in elderly patients, family verbalized understanding and agrees with the plan.

## 2017-06-22 NOTE — H&P ADULT - NSHPSOCIALHISTORY_GEN_ALL_CORE
Social History:    Marital Status:  (   )    (   ) Single    (   )    (  x)   Occupation:   Lives with: (  ) alone  ( x ) children   (  ) spouse   (  ) parents  (  ) other    Substance Use (street drugs): ( x ) never used  (  ) other:  Tobacco Usage:  (   ) never smoked   ( x  ) former smoker   (   ) current smoker  (     ) pack year  (        ) last cigarette date  Alcohol Usage:none Social History:    Marital Status:  (   )    (   ) Single    (   )    (  x)   Occupation: retired , dress maker.   Lives with: (  ) alone  ( x ) children   (  ) spouse   (  ) parents  (  ) other    Substance Use (street drugs): ( x ) never used  (  ) other:  Tobacco Usage:  (   ) never smoked   ( x  ) former smoker   (   ) current smoker  (     ) pack year  (        ) last cigarette date  Alcohol Usage:none

## 2017-06-22 NOTE — ED PROVIDER NOTE - GASTROINTESTINAL, MLM
Abdomen soft, non-tender, no guarding. R sided abdominal lipoma. + bowel sounds. Abdomen soft, non-tender, no guarding. R sided abdominal lipoma. + bowel sounds. FOBT negative

## 2017-06-22 NOTE — BEHAVIORAL HEALTH ASSESSMENT NOTE - RISK ASSESSMENT
89 y/o male with PMH of aortic stenosis, mild dementia brought in due to AMS, worsening agitation.   Patient has no psychiatric history. Suicidal statements more indicative of delirious state. On no psych meds.   Patient is at risk for inadvertent harm to self due to pulling out Carrion, getting out of bed etc.secondary to delirium

## 2017-06-22 NOTE — BEHAVIORAL HEALTH ASSESSMENT NOTE - NSBHCONSULTRECOMMENDOTHER_PSY_A_CORE FT
EKG: Antipsychotics can be given if QTc < 500    Avoid benzos, opioids and anticholinergics if possible as it may worsen confusion  Maintain sleep wake cycle  Provide frequent reorientation and redirection  Family member at bedside if possible  Assess for need for hearing aid (if applicable)

## 2017-06-22 NOTE — PROGRESS NOTE ADULT - SUBJECTIVE AND OBJECTIVE BOX
Urology called regarding patient pulling contreras out prior to arrival. 18F coude contreras placed using sterile technique. 400cc's of clear red urine drained. Pt tolerated procedure well.   -Follow up with Dr. Lynn in office.

## 2017-06-22 NOTE — H&P ADULT - ASSESSMENT
89 M symptomatic AS (not operative candidate), HTN, HLD, hypothyroid, GERD, BPH, dementia, and prior UTI, p/w acute agitation and hematuria s/p pulling contreras out in setting of chronic progressive dementia. 89M w/ PMHx of symptomatic severe AS (not operative candidate), HTN, HLD, CAD (s/p PCI), Hypothyroid, GERD, BPH, Dementia (unknown baseline, currently AOx1), and prior UTI, p/w acute agitation and hematuria after pulling Carrion out in setting of chronic progressive dementia. Suspect metabolic encephalopathy 2/2 complicated UTI (patient not septic).

## 2017-06-22 NOTE — ED PROVIDER NOTE - PROGRESS NOTE DETAILS
patient declining contreras catheter - stating that he no longer wants to live and that the catheter is uncomfortable - speaking with family they said that he has said this in the past but has become more frequent over past 1-2 weeks - never tried to hurt himself in the past pt saying he wants to end his life - psych paged

## 2017-06-22 NOTE — ED PROVIDER NOTE - MEDICAL DECISION MAKING DETAILS
Ellie Resident: 89 y/o with Aortic stenosis and recently placed contreras 2/2 to urinary retention p/w pulling contreras out via ambuluance. Per family, patient pulled his catheter out this morning. After further questioning, both children endorse that patient has been signficantly more confused recently over past week, with a 10 lb weight loss in past few days - patient is A+O to person and place, but very hard of hearing - afebrile here with no obvious signs of infection - will perform an AMS workup including head CT and CXR, basic labs, and urinalysis w contreras replacement Ellie Resident: 89 y/o with Aortic stenosis and recently placed contreras 2/2 to urinary retention p/w pulling contreras out via ambuluance. Per family, patient pulled his catheter out this morning. After further questioning, both children endorse that patient has been signficantly more confused recently over past week, with a 10 lb weight loss in past few days - patient is A+O to person and place, but very hard of hearing - afebrile here with no obvious signs of infection - will perform an AMS workup including head CT and CXR, basic labs, and urinalysis w contreras replacement  ATTD: worsening ms, concern for infection / cva. will check  ct head, check labs, also given weight loss, FTT / concern for maligancy, will check labs, ivf hydration,check urine check xray. reeval for dispo

## 2017-06-22 NOTE — H&P ADULT - PMH
Aortic stenosis    BPH (benign prostatic hyperplasia)    Elevated cholesterol    Hypertension Aortic stenosis    BPH (benign prostatic hyperplasia)    Coronary artery disease  s/p PCI  Dementia    Elevated cholesterol    Hypertension    Hypothyroid

## 2017-06-22 NOTE — ED ADULT NURSE REASSESSMENT NOTE - NS ED NURSE REASSESS COMMENT FT1
urology consult came to insert contreras catheter. RN at bedside. pt tolerated procedure well. contreras catheter secured to pt and stretcher. safety and comfort measures maintained.

## 2017-06-22 NOTE — H&P ADULT - PROBLEM SELECTOR PLAN 2
-In setting of traumatic contreras removal  -Blood tinged urine clearing up, can monitor Hgb for now and increase frequency if am hgb dropping  -not on a/c, only aspirin low dose  -urology following, contreras replaced in ED -In setting of traumatic Carrion removal  -Monitor Hb closely  -No HQS  -If patient develops MINA would d/w  need for CBI  -Pt is on ASA 81 w/ h/o CAD s/p PCI - will c/w ASA 81 for now - D/C if hematuria worsens  -Urology following, Carrion replaced in ED

## 2017-06-22 NOTE — H&P ADULT - PROBLEM SELECTOR PLAN 3
-c/w flomax, finasteride  -c/w ben, urology following  - -c/w flomax, finasteride  -c/w ben, urology following -behavioral health assessment recs seen  -will get ekg to check qtc  -if qtc <500, will consider haldol 0.25 standing with 0.125 prn for agitation  -reorientation  -family member at bedside if possible  -enhanced supervision Delirium and metabolic encephalopathy  -Behavioral health assessment recs seen  -QTc: 467 - will give Haldol 0.25mg p.o. bid standing with Haldol 0.125 IVP q6h PRN for agitation - would repeat EKG in am to assess QTc and D/C Haldol if QTc prolonging  -Reorientation  -Family member at bedside if possible  -Enhanced supervision (Psych did not rec 1:1), Fall precautions

## 2017-06-22 NOTE — ED ADULT NURSE REASSESSMENT NOTE - NS ED NURSE REASSESS COMMENT FT1
RN spoke to the pt about replacing the catheter and he didn't want it. Pt states "I don't want any tubes in me, I am old and want to die in peace." Pt refused the catheter. Ellie BACA made aware. Pt's IV secured to his arm with gauze. Will continue to monitor pt.

## 2017-06-22 NOTE — H&P ADULT - PROBLEM SELECTOR PLAN 1
-UA+ with above baseline agitation  - -UA+ with above baseline agitation, no prior culture data available  -Continue with ceftriaxone for now; given contreras and uti in male, would treat for 7 day course  -f/u ucx -UA+ with above baseline agitation, no prior culture data available  -Continue with Ceftriaxone for complicated UTI; given Carrion and UTI in male, would treat for 7 day course, if patient spikes would send BCx x 2  -f/u UCx

## 2017-06-23 LAB
ANION GAP SERPL CALC-SCNC: 12 MMOL/L — SIGNIFICANT CHANGE UP (ref 5–17)
BUN SERPL-MCNC: 15 MG/DL — SIGNIFICANT CHANGE UP (ref 7–23)
CALCIUM SERPL-MCNC: 9.9 MG/DL — SIGNIFICANT CHANGE UP (ref 8.4–10.5)
CHLORIDE SERPL-SCNC: 98 MMOL/L — SIGNIFICANT CHANGE UP (ref 96–108)
CO2 SERPL-SCNC: 26 MMOL/L — SIGNIFICANT CHANGE UP (ref 22–31)
CREAT SERPL-MCNC: 0.83 MG/DL — SIGNIFICANT CHANGE UP (ref 0.5–1.3)
CULTURE RESULTS: NO GROWTH — SIGNIFICANT CHANGE UP
GLUCOSE SERPL-MCNC: 94 MG/DL — SIGNIFICANT CHANGE UP (ref 70–99)
HCT VFR BLD CALC: 35.4 % — LOW (ref 39–50)
HGB BLD-MCNC: 12.1 G/DL — LOW (ref 13–17)
HIV 1+2 AB+HIV1 P24 AG SERPL QL IA: SIGNIFICANT CHANGE UP
MAGNESIUM SERPL-MCNC: 2 MG/DL — SIGNIFICANT CHANGE UP (ref 1.6–2.6)
MCHC RBC-ENTMCNC: 32 PG — SIGNIFICANT CHANGE UP (ref 27–34)
MCHC RBC-ENTMCNC: 34.1 GM/DL — SIGNIFICANT CHANGE UP (ref 32–36)
MCV RBC AUTO: 93.8 FL — SIGNIFICANT CHANGE UP (ref 80–100)
PHOSPHATE SERPL-MCNC: 3.1 MG/DL — SIGNIFICANT CHANGE UP (ref 2.5–4.5)
PLATELET # BLD AUTO: 167 K/UL — SIGNIFICANT CHANGE UP (ref 150–400)
POTASSIUM SERPL-MCNC: 4 MMOL/L — SIGNIFICANT CHANGE UP (ref 3.5–5.3)
POTASSIUM SERPL-SCNC: 4 MMOL/L — SIGNIFICANT CHANGE UP (ref 3.5–5.3)
RBC # BLD: 3.77 M/UL — LOW (ref 4.2–5.8)
RBC # FLD: 12.5 % — SIGNIFICANT CHANGE UP (ref 10.3–14.5)
SODIUM SERPL-SCNC: 136 MMOL/L — SIGNIFICANT CHANGE UP (ref 135–145)
SPECIMEN SOURCE: SIGNIFICANT CHANGE UP
T PALLIDUM AB TITR SER: NEGATIVE — SIGNIFICANT CHANGE UP
TSH SERPL-MCNC: 1.57 UIU/ML — SIGNIFICANT CHANGE UP (ref 0.27–4.2)
VIT B12 SERPL-MCNC: 408 PG/ML — SIGNIFICANT CHANGE UP (ref 243–894)
WBC # BLD: 7.3 K/UL — SIGNIFICANT CHANGE UP (ref 3.8–10.5)
WBC # FLD AUTO: 7.3 K/UL — SIGNIFICANT CHANGE UP (ref 3.8–10.5)

## 2017-06-23 PROCEDURE — 99232 SBSQ HOSP IP/OBS MODERATE 35: CPT | Mod: GC

## 2017-06-23 PROCEDURE — 99233 SBSQ HOSP IP/OBS HIGH 50: CPT | Mod: GC

## 2017-06-23 RX ADMIN — SIMVASTATIN 20 MILLIGRAM(S): 20 TABLET, FILM COATED ORAL at 22:09

## 2017-06-23 RX ADMIN — Medication 81 MILLIGRAM(S): at 12:19

## 2017-06-23 RX ADMIN — FINASTERIDE 5 MILLIGRAM(S): 5 TABLET, FILM COATED ORAL at 12:19

## 2017-06-23 RX ADMIN — TAMSULOSIN HYDROCHLORIDE 0.4 MILLIGRAM(S): 0.4 CAPSULE ORAL at 22:08

## 2017-06-23 RX ADMIN — HALOPERIDOL DECANOATE 0.25 MILLIGRAM(S): 100 INJECTION INTRAMUSCULAR at 18:28

## 2017-06-23 RX ADMIN — HALOPERIDOL DECANOATE 0.25 MILLIGRAM(S): 100 INJECTION INTRAMUSCULAR at 08:49

## 2017-06-23 RX ADMIN — CEFTRIAXONE 100 GRAM(S): 500 INJECTION, POWDER, FOR SOLUTION INTRAMUSCULAR; INTRAVENOUS at 15:58

## 2017-06-23 NOTE — PROGRESS NOTE BEHAVIORAL HEALTH - NSBHFUPINTERVALHXFT_PSY_A_CORE
(0) independent
Patient seen and evaluated, staff consulted, chart, labs, meds reviewed.     Patient continued to be confused, received 0.125 mg Haldol overnight.  Seen at bedside. Sleepy, participates minimally in assessment. Hearing difficulty, responds "ok" when asked how he is. Orientedx1.

## 2017-06-23 NOTE — PROGRESS NOTE BEHAVIORAL HEALTH - CASE SUMMARY
This is a 89-y.o. male patient with PMH of aortic stenosis, HTN, BPH, elevated cholesterol CAD, mild dementia, now presenting with delirium, slightly improving as per family. Please follow original recommendations. I have seen and evaluated this patient myself, chart, labs, meds reviewed. I agree with fellow's assessment and plan.

## 2017-06-23 NOTE — DISCHARGE NOTE ADULT - MEDICATION SUMMARY - MEDICATIONS TO TAKE
I will START or STAY ON the medications listed below when I get home from the hospital:    finasteride 5 mg oral tablet  -- 1 tab(s) by mouth once a day  -- Indication: For BPH (benign prostatic hyperplasia)    Aspirin Enteric Coated 81 mg oral delayed release tablet  -- 1 tab(s) by mouth once a day  -- Indication: For Coronary artery disease    acetaminophen-oxycodone 325 mg-5 mg oral tablet  -- 1 tab(s) by mouth every 6 hours, As needed, Moderate Pain (4 - 6)  -- Indication: For Dementia    Flomax 0.4 mg oral capsule  -- 1 cap(s) by mouth once a day  -- Indication: For BPH (benign prostatic hyperplasia)    simvastatin 20 mg oral tablet  -- 1 tab(s) by mouth once a day (at bedtime)  -- Indication: For Coronary artery disease    haloperidol 0.5 mg oral tablet  -- take half a tablet twice daily by mouth  -- Indication: For Delirium    levothyroxine 75 mcg (0.075 mg) oral capsule  -- 1 cap(s) by mouth once a day  -- Indication: For Hypothyroid I will START or STAY ON the medications listed below when I get home from the hospital:    finasteride 5 mg oral tablet  -- 1 tab(s) by mouth once a day  -- Indication: For BPH (benign prostatic hyperplasia)    Aspirin Enteric Coated 81 mg oral delayed release tablet  -- 1 tab(s) by mouth once a day  -- Indication: For Coronary artery disease    acetaminophen-oxycodone 325 mg-5 mg oral tablet  -- 1 tab(s) by mouth every 6 hours, As needed, Moderate Pain (4 - 6)  -- Indication: For Pain    Flomax 0.4 mg oral capsule  -- 1 cap(s) by mouth once a day  -- Indication: For BPH (benign prostatic hyperplasia)    simvastatin 20 mg oral tablet  -- 1 tab(s) by mouth once a day (at bedtime)  -- Indication: For Coronary artery disease    haloperidol 0.5 mg oral tablet  -- take half a tablet twice daily by mouth  -- Indication: For Agitation    levothyroxine 75 mcg (0.075 mg) oral capsule  -- 1 cap(s) by mouth once a day  -- Indication: For Hypothyroid I will START or STAY ON the medications listed below when I get home from the hospital:    finasteride 5 mg oral tablet  -- 1 tab(s) by mouth once a day  -- Indication: For BPH (benign prostatic hyperplasia)    Aspirin Enteric Coated 81 mg oral delayed release tablet  -- 1 tab(s) by mouth once a day  -- Indication: For Coronary artery disease    Flomax 0.4 mg oral capsule  -- 1 cap(s) by mouth once a day  -- Indication: For BPH (benign prostatic hyperplasia)    simvastatin 20 mg oral tablet  -- 1 tab(s) by mouth once a day (at bedtime)  -- Indication: For Coronary artery disease    haloperidol 0.5 mg oral tablet  -- take half a tablet twice daily by mouth  -- Indication: For Agitation    levothyroxine 75 mcg (0.075 mg) oral capsule  -- 1 cap(s) by mouth once a day  -- Indication: For Hypothyroid

## 2017-06-23 NOTE — PROGRESS NOTE ADULT - SUBJECTIVE AND OBJECTIVE BOX
Patient is a 89y old  Male who presents with a chief complaint of Agitation, hematuria (2017 16:29)        SUBJECTIVE / OVERNIGHT EVENTS:  admitted to the hospital overnight for acute agitation and hematuria after pulling contreras out was found to have a UTI. This morning patient denies any N/V, F/C, abdom pain, chest pain, SOB. States he's at baseline.     MEDICATIONS  (STANDING):  cefTRIAXone   IVPB 1Gram(s) IV Intermittent every 24 hours  levothyroxine 75MICROGram(s) Oral daily  finasteride 5milliGRAM(s) Oral daily  tamsulosin 0.4milliGRAM(s) Oral at bedtime  haloperidol     Tablet 0.25milliGRAM(s) Oral two times a day  aspirin enteric coated 81milliGRAM(s) Oral daily  simvastatin 20milliGRAM(s) Oral at bedtime    MEDICATIONS  (PRN):  haloperidol    Injectable 0.125milliGRAM(s) IV Push every 6 hours PRN Agitation      Vital Signs Last 24 Hrs  T(C): 36.5, Max: 37.1 (06-22 @ 21:00)  HR: 92 (52 - 110)  BP: 128/80 (98/55 - 153/90)  RR: 18 (15 - 18)  SpO2: 100% (97% - 100%)  Wt(kg): --  CAPILLARY BLOOD GLUCOSE    I&O's Summary    I & Os for current day (as of 2017 07:39)  =============================================  IN: 0 ml / OUT: 1300 ml / NET: -1300 ml      PHYSICAL EXAM  GENERAL: NAD, well-developed  HEAD:  Atraumatic, Normocephalic  EYES: EOMI, conjunctiva and sclera clear  NECK: Supple, No JVD  CHEST/LUNG: Clear to auscultation bilaterally; No wheeze  HEART: Regular rate and rhythm; 3/6 systolic murmur   ABDOMEN: Soft, Nontender, Nondistended; Bowel sounds present  EXTREMITIES:  No clubbing, cyanosis, or edema  : contreras in place draining red-tinged fluid  PSYCH: AAOx1  SKIN: No rashes or lesions    LABS:                        12.1   7.3   )-----------( 167      ( 2017 06:55 )             35.4     -    136  |  98  |  15  ----------------------------<  94  4.0   |  26  |  0.83    Ca    9.9      2017 06:55  Phos  3.1     -  Mg     2.0         TPro  6.6  /  Alb  3.5  /  TBili  0.7  /  DBili  x   /  AST  18  /  ALT  9<L>  /  AlkPhos  55            Urinalysis Basic - ( 2017 11:37 )    Color: Red / Appearance: Turbid / S.010 / pH: x  Gluc: x / Ketone: Negative  / Bili: Negative / Urobili: Negative   Blood: x / Protein: 300 mg/dL / Nitrite: Negative   Leuk Esterase: Small / RBC: >50 /HPF / WBC 11-25 /HPF   Sq Epi: x / Non Sq Epi: x / Bacteria: x        RADIOLOGY & ADDITIONAL TESTS:    Imaging Personally Reviewed:  Consultant(s) Notes Reviewed:    Care Discussed with Consultants/Other Providers: Patient is a 89y old  Male who presents with a chief complaint of Agitation, hematuria (2017 16:29)        SUBJECTIVE / OVERNIGHT EVENTS:  admitted to the hospital overnight for acute agitation and hematuria after pulling contreras out was found to have a UTI. Was agitated overnight and received haldol. This morning patient denies any N/V, F/C, abdom pain, chest pain, SOB. States he's at baseline.     MEDICATIONS  (STANDING):  cefTRIAXone   IVPB 1Gram(s) IV Intermittent every 24 hours  levothyroxine 75MICROGram(s) Oral daily  finasteride 5milliGRAM(s) Oral daily  tamsulosin 0.4milliGRAM(s) Oral at bedtime  haloperidol     Tablet 0.25milliGRAM(s) Oral two times a day  aspirin enteric coated 81milliGRAM(s) Oral daily  simvastatin 20milliGRAM(s) Oral at bedtime    MEDICATIONS  (PRN):  haloperidol    Injectable 0.125milliGRAM(s) IV Push every 6 hours PRN Agitation      Vital Signs Last 24 Hrs  T(C): 36.5, Max: 37.1 (06-22 @ 21:00)  HR: 92 (52 - 110)  BP: 128/80 (98/55 - 153/90)  RR: 18 (15 - 18)  SpO2: 100% (97% - 100%)  Wt(kg): --  CAPILLARY BLOOD GLUCOSE    I&O's Summary    I & Os for current day (as of 2017 07:39)  =============================================  IN: 0 ml / OUT: 1300 ml / NET: -1300 ml      PHYSICAL EXAM  GENERAL: NAD, well-developed  HEAD:  Atraumatic, Normocephalic  EYES: EOMI, conjunctiva and sclera clear  NECK: Supple, No JVD  CHEST/LUNG: Clear to auscultation bilaterally; No wheeze  HEART: Regular rate and rhythm; 3/6 systolic murmur   ABDOMEN: Soft, Nontender, Nondistended; Bowel sounds present  EXTREMITIES:  No clubbing, cyanosis, or edema  : contreras in place draining red-tinged fluid  PSYCH: AAOx1  SKIN: No rashes or lesions    LABS:                        12.1   7.3   )-----------( 167      ( 2017 06:55 )             35.4     -    136  |  98  |  15  ----------------------------<  94  4.0   |  26  |  0.83    Ca    9.9      2017 06:55  Phos  3.1     -  Mg     2.0         TPro  6.6  /  Alb  3.5  /  TBili  0.7  /  DBili  x   /  AST  18  /  ALT  9<L>  /  AlkPhos  55            Urinalysis Basic - ( 2017 11:37 )    Color: Red / Appearance: Turbid / S.010 / pH: x  Gluc: x / Ketone: Negative  / Bili: Negative / Urobili: Negative   Blood: x / Protein: 300 mg/dL / Nitrite: Negative   Leuk Esterase: Small / RBC: >50 /HPF / WBC 11-25 /HPF   Sq Epi: x / Non Sq Epi: x / Bacteria: x        RADIOLOGY & ADDITIONAL TESTS:    Imaging Personally Reviewed:  Consultant(s) Notes Reviewed:    Care Discussed with Consultants/Other Providers:

## 2017-06-23 NOTE — CHART NOTE - NSCHARTNOTEFT_GEN_A_CORE
6/23/17 1009  Telephone conversation with Tiffanie (daughter)   Spoke with patient in regards to patient. Daughter and family are overwhelmed, pt has been having progressive cognitive and physical decline for months. Cannot state when he started to become agitated. States was recently treated for UTI (otherwise can't state exactly when) and had a contreras placed which was removed and then replaced approx 2 days ago for retention. States that Dr. Lynn (uro) indicated this would be a chronic contreras. Family is overwhelmed stating he needs 24hr assistance and would like long term placement for him. To come in around 1130AM today.     Yara Pretty PGY 1

## 2017-06-23 NOTE — DISCHARGE NOTE ADULT - SECONDARY DIAGNOSIS.
Hematuria, unspecified BPH (benign prostatic hyperplasia) Urinary tract infection associated with catheterization of urinary tract, unspecified indwelling urinary catheter type, subsequent encounter Syncope and collapse Delirium

## 2017-06-23 NOTE — PROGRESS NOTE ADULT - ATTENDING COMMENTS
89 yo man with PMH of dementia and severe AS a/w worsening confusion/agitation/functional status at home.  Recently had a contreras placed for urinary retention and he pulled it out.  Was agitated overnight, put on haldol by psych.  This AM he is very calm, conversant, though nor oriented at all except to his name.    1) Dementia with behavioral disturbance  - re: rehab vs SNF placement per family wishes    2) UTI  -hard to say if UTI is present - U/A positive but very bloody - will c/w ceftriaxone if culture grows >100k bacteria

## 2017-06-23 NOTE — DISCHARGE NOTE ADULT - CARE PLAN
Principal Discharge DX:	Delirium  Secondary Diagnosis:	Hematuria, unspecified  Secondary Diagnosis:	BPH (benign prostatic hyperplasia) Principal Discharge DX:	Delirium  Goal:	follow up  Instructions for follow-up, activity and diet:	You came in agitated and pulled out your contreras. Your agitation was most likely secondary to a urinary tract infection. You were started on IV antibiotics and urine cultures grew ___. Psych was consulted and recommended haldol 0.25mg twice a day. Please continue taking this medication. Please follow up with your PCP in 1 week.  Secondary Diagnosis:	Hematuria, unspecified  Instructions for follow-up, activity and diet:	You came in with blood in your urine after pulling out your contreras. Your contreras was replaced by urology. Your blood levels were monitored and it remained stable. Please follow up with Dr. Lynn (urology) in 1 week.  Secondary Diagnosis:	BPH (benign prostatic hyperplasia)  Instructions for follow-up, activity and diet:	You have a history of BPH causing urinary obstruction. Please continue with tamsulosin 0.4mg once at night and finasteride 5mg once a day. Please continue with your contreras and follow up with Dr. Lynn (urology) in 1 week.  Secondary Diagnosis:	Urinary tract infection associated with catheterization of urinary tract, unspecified indwelling urinary catheter type, subsequent encounter  Instructions for follow-up, activity and diet:	You came in agitated and your urine analysis on admission which was concerning for Principal Discharge DX:	Dementia  Goal:	comfort care at nursing home  Instructions for follow-up, activity and diet:	You have end stage dementia.  Your family and the healtchare workers at the nursing home should regularly orient you to the date, time, location, etc.  Secondary Diagnosis:	Hematuria, unspecified  Goal:	comfort care at nursing home  Instructions for follow-up, activity and diet:	You came in with blood in your urine after pulling out your contreras. Your contreras was replaced by urology. Your blood levels were monitored and it remained stable.  Continue to monitor at the nursing home.  Secondary Diagnosis:	BPH (benign prostatic hyperplasia)  Goal:	comfort care at nursing home  Instructions for follow-up, activity and diet:	You have a history of BPH causing urinary obstruction. Please continue with tamsulosin 0.4mg once at night and finasteride 5mg once a day. Please continue with your contreras and make sure to monitor proper output and coloration at the nursing home.  Secondary Diagnosis:	Delirium  Goal:	comfort care at nursing home  Instructions for follow-up, activity and diet:	You came in agitated and pulled out your contreras.  Psych was consulted and recommended haldol 0.25mg twice a day. Please continue taking this medication at the nursing home.  Secondary Diagnosis:	Syncope and collapse  Goal:	comfort care at nursing home  Instructions for follow-up, activity and diet:	On 6/26/17 you had an episode of syncope.  You should make sure to remain properly hydrated and restraints should be used when necessary to keep you from falling. Principal Discharge DX:	Dementia  Goal:	comfort care at nursing home  Instructions for follow-up, activity and diet:	You have end stage dementia.  Your family and the healtchare workers at the nursing home should regularly orient you to the date, time, location, etc. You will receive comfort care because you have end stage dementia.  Secondary Diagnosis:	Hematuria, unspecified  Goal:	comfort care at nursing home  Instructions for follow-up, activity and diet:	You came in with blood in your urine after pulling out your contreras. Your contreras was replaced by urology. Your blood levels were monitored and it remained stable.  Continue to monitor at the nursing home.  Secondary Diagnosis:	BPH (benign prostatic hyperplasia)  Goal:	comfort care at nursing home  Instructions for follow-up, activity and diet:	You have a history of BPH causing urinary obstruction. Please continue with tamsulosin 0.4mg once at night and finasteride 5mg once a day. Please continue with your contreras and make sure to monitor proper output and coloration at the nursing home.  Secondary Diagnosis:	Delirium  Goal:	comfort care at nursing home  Instructions for follow-up, activity and diet:	You came in agitated and pulled out your contreras.  Psych was consulted and recommended haldol 0.25mg twice a day. Please continue taking this medication at the nursing home.  Secondary Diagnosis:	Syncope and collapse  Goal:	comfort care at nursing home  Instructions for follow-up, activity and diet:	On 6/26/17 you had an episode of syncope in the chair. You should make sure to remain properly hydrated and restraints should be used when necessary to keep you from falling.

## 2017-06-23 NOTE — DISCHARGE NOTE ADULT - MEDICATION SUMMARY - MEDICATIONS TO STOP TAKING
I will STOP taking the medications listed below when I get home from the hospital:    celecoxib  --  by mouth

## 2017-06-23 NOTE — PROGRESS NOTE ADULT - PROBLEM SELECTOR PLAN 6
-no s/s of overt heart failure at this time, no significant edema on exam  -pt no longer on diuretics as outpatient  -not candidate for surgery  -currently SBP <120 so no need for diuretics or ace-i  -pt on ASA and statin, would d/w outpatient Cardiology risks vs benefits of continuing both - given h/o CAD w/ PCI will continue both for now

## 2017-06-23 NOTE — DISCHARGE NOTE ADULT - PLAN OF CARE
You came in agitated and pulled out your contreras. Your agitation was most likely secondary to a urinary tract infection. You were started on IV antibiotics and urine cultures grew ___. Psych was consulted and recommended haldol 0.25mg twice a day. Please continue taking this medication. Please follow up with your PCP in 1 week. You came in with blood in your urine after pulling out your contreras. Your contreras was replaced by urology. Your blood levels were monitored and it remained stable. Please follow up with Dr. Lynn (urology) in 1 week. You have a history of BPH causing urinary obstruction. Please continue with tamsulosin 0.4mg once at night and finasteride 5mg once a day. Please continue with your contreras and follow up with Dr. Lynn (urology) in 1 week. follow up You came in agitated and your urine analysis on admission which was concerning for comfort care at nursing home On 6/26/17 you had an episode of syncope.  You should make sure to remain properly hydrated and restraints should be used when necessary to keep you from falling. You have end stage dementia.  Your family and the healtchare workers at the nursing home should regularly orient you to the date, time, location, etc. You came in with blood in your urine after pulling out your contreras. Your contreras was replaced by urology. Your blood levels were monitored and it remained stable.  Continue to monitor at the nursing home. You have a history of BPH causing urinary obstruction. Please continue with tamsulosin 0.4mg once at night and finasteride 5mg once a day. Please continue with your contreras and make sure to monitor proper output and coloration at the nursing home. You came in agitated and pulled out your contreras.  Psych was consulted and recommended haldol 0.25mg twice a day. Please continue taking this medication at the nursing home. On 6/26/17 you had an episode of syncope in the chair. You should make sure to remain properly hydrated and restraints should be used when necessary to keep you from falling. You have end stage dementia.  Your family and the healtchare workers at the nursing home should regularly orient you to the date, time, location, etc. You will receive comfort care because you have end stage dementia.

## 2017-06-23 NOTE — DISCHARGE NOTE ADULT - PATIENT PORTAL LINK FT
“You can access the [facility] Patient Portal, offered by [facility], by registering with the following website: [email or web address]”

## 2017-06-23 NOTE — PROGRESS NOTE BEHAVIORAL HEALTH - NSBHCHARTREVIEWVS_PSY_A_CORE FT
T(C): 36.5, Max: 37.1 (06-22 @ 21:00)  HR: 92 (52 - 94)  BP: 128/80 (98/55 - 153/90)  RR: 18 (15 - 18)  SpO2: 100% (97% - 100%)

## 2017-06-23 NOTE — DISCHARGE NOTE ADULT - HOSPITAL COURSE
89 M symptomatic AS (not operative candidate), HTN, HLD, hypothyroid, GERD, BPH, dementia, and prior UTI, p/w acute agitation and hematuria s/p pulling contreras out in setting of chronic progressive dementia.     Patient 89 M symptomatic AS (not operative candidate), HTN, HLD, hypothyroid, GERD, BPH, dementia, and prior UTI, p/w acute agitation and hematuria s/p pulling contreras out in setting of chronic progressive dementia.     Patient was noted to have a UA with 11-25 WBC, RBC >50, small LE. 89 M symptomatic AS (not operative candidate), HTN, HLD, hypothyroid, GERD, BPH, dementia, and prior UTI, p/w acute agitation and hematuria s/p pulling contreras out in setting of chronic progressive dementia.     Patient was noted to have a UA with 11-25 WBC, RBC >50, small LE. Patient was started on IV ceftriaxone. Urine cx were sent. Contreras was re-placed by urology.     Psych saw the patient while in the hospital for agitation and recommended haldol 0.25mg PO BID and haldol 0.125mg IV Q6hr.     Family expressed how they  were overwhelmed with care of the patient at home given his decline mentally and physically. PT was consulted 89 M symptomatic AS (not operative candidate), HTN, HLD, hypothyroid, GERD, BPH, dementia, and prior UTI, p/w acute agitation and hematuria s/p pulling contreras out in setting of chronic progressive dementia.  Patient was noted to have a UA with 11-25 WBC, RBC >50, small LE. Patient was started on IV ceftriaxone. Urine cx were sent. Contreras was re-placed by urology.  Psych saw the patient while in the hospital for agitation and recommended haldol 0.25mg PO BID and haldol 0.125mg IV Q6hr PRN.  After several days without agitation the IV PRN was discontinued.  Patient has been AAOX1 (only to self) for quite some time now.  At around 2PM Monday 6/26/17 the patient's family was at bedside when they called for help because the patient had an episode of syncope while sitting in his chair.  CT normal, BMP and CBC wnl, EKG showed 1st degree block with previously known L anterior fascicular block.  Family expressed how they  were overwhelmed with care of the patient at home given his decline mentally and physically. PT was consulted and so was palliative.  Family was realistic about patient's end stage dementia and requests comfort care at nursing home.

## 2017-06-23 NOTE — PROGRESS NOTE BEHAVIORAL HEALTH - NSBHCHARTREVIEWLAB_PSY_A_CORE FT
CBC Full  -  ( 23 Jun 2017 06:55 )  WBC Count : 7.3 K/uL  Hemoglobin : 12.1 g/dL  Hematocrit : 35.4 %  Platelet Count - Automated : 167 K/uL  Mean Cell Volume : 93.8 fl  Mean Cell Hemoglobin : 32.0 pg  Mean Cell Hemoglobin Concentration : 34.1 gm/dL  Auto Neutrophil # : x  Auto Lymphocyte # : x  Auto Monocyte # : x  Auto Eosinophil # : x  Auto Basophil # : x  Auto Neutrophil % : x  Auto Lymphocyte % : x  Auto Monocyte % : x  Auto Eosinophil % : x  Auto Basophil % : x    06-23    136  |  98  |  15  ----------------------------<  94  4.0   |  26  |  0.83    Ca    9.9      23 Jun 2017 06:55  Phos  3.1     06-23  Mg     2.0     06-23    TPro  6.6  /  Alb  3.5  /  TBili  0.7  /  DBili  x   /  AST  18  /  ALT  9<L>  /  AlkPhos  55  06-22

## 2017-06-23 NOTE — PROGRESS NOTE ADULT - PROBLEM SELECTOR PLAN 1
-UA+ with above baseline agitation, no prior culture data available  -Continue with Ceftriaxone for complicated UTI; given Carrion and UTI in male, would treat for 7 day course, if patient spikes would send BCx x 2  -f/u UCx

## 2017-06-23 NOTE — PROGRESS NOTE BEHAVIORAL HEALTH - SUMMARY
89 y/o male with PMH of aortic stenosis, HTN, BPH, elevated cholesterol CAD, mild dementia recently placed contreras on 6/20 by Dr. Lynn pulled out his contreras this morning. Patient had it placed 2/2 to BPH. Patient is on flomax. Denies any fevers, chills, chest pain, SOB, N/V/D. At baseline, patient is able to walk with a walker. Patient lives at home with daughter.  Patient has some underlying dementia per history and now delirious secondary to UTI. No h/o SI/HI/hallucinations or any psychiatric issues in the past. AMS secondary to delirium.    Did not get standing Haldol yesterday, received 2 0.125 mg PRN's

## 2017-06-23 NOTE — PROGRESS NOTE ADULT - PROBLEM SELECTOR PLAN 5
-check TSH, B12, HIV, RPR  -GOC in ER, proxy Tiffanie has signed DNR/DNI  -will sign out to day team to f/u with ; family amenable to placement in long term facility upon d/c

## 2017-06-23 NOTE — PROGRESS NOTE ADULT - PROBLEM SELECTOR PLAN 3
Delirium and metabolic encephalopathy  -Behavioral health assessment recs seen  -QTc: 467 - will give Haldol 0.25mg p.o. bid standing with Haldol 0.125 IVP q6h PRN for agitation - would repeat EKG in am to assess QTc and D/C Haldol if QTc prolonging  -Reorientation  -Family member at bedside if possible  -Enhanced supervision (Psych did not rec 1:1), Fall precautions

## 2017-06-23 NOTE — PROGRESS NOTE ADULT - ASSESSMENT
89M w/ PMHx of symptomatic severe AS (not operative candidate), HTN, HLD, CAD (s/p PCI), Hypothyroid, GERD, BPH, Dementia (unknown baseline, currently AOx1), and prior UTI, p/w acute agitation and hematuria after pulling Carrion out in setting of chronic progressive dementia. Suspect metabolic encephalopathy 2/2 complicated UTI (patient not septic).

## 2017-06-23 NOTE — PROGRESS NOTE BEHAVIORAL HEALTH - RISK ASSESSMENT
89 y/o male with PMH of aortic stenosis, mild dementia brought in due to AMS, worsening agitation.   Patient has no psychiatric history. Suicidal statements more indicative of delirious state. On no psych meds.   Patient is at risk for inadvertent harm to self due to pulling out Carrion, getting out of bed etc. secondary to delirium

## 2017-06-23 NOTE — PROGRESS NOTE ADULT - PROBLEM SELECTOR PLAN 2
-In setting of traumatic Carrion removal  -Monitor Hb closely  -No HQS  -If patient develops MINA would d/w  need for CBI  -Pt is on ASA 81 w/ h/o CAD s/p PCI - will c/w ASA 81 for now - D/C if hematuria worsens  -Urology following, Carrion replaced in ED

## 2017-06-24 LAB
ANION GAP SERPL CALC-SCNC: 11 MMOL/L — SIGNIFICANT CHANGE UP (ref 5–17)
BUN SERPL-MCNC: 19 MG/DL — SIGNIFICANT CHANGE UP (ref 7–23)
CALCIUM SERPL-MCNC: 9.6 MG/DL — SIGNIFICANT CHANGE UP (ref 8.4–10.5)
CHLORIDE SERPL-SCNC: 96 MMOL/L — SIGNIFICANT CHANGE UP (ref 96–108)
CO2 SERPL-SCNC: 25 MMOL/L — SIGNIFICANT CHANGE UP (ref 22–31)
CREAT SERPL-MCNC: 0.84 MG/DL — SIGNIFICANT CHANGE UP (ref 0.5–1.3)
GLUCOSE SERPL-MCNC: 104 MG/DL — HIGH (ref 70–99)
HCT VFR BLD CALC: 35.8 % — LOW (ref 39–50)
HGB BLD-MCNC: 11.7 G/DL — LOW (ref 13–17)
MCHC RBC-ENTMCNC: 30.6 PG — SIGNIFICANT CHANGE UP (ref 27–34)
MCHC RBC-ENTMCNC: 32.6 GM/DL — SIGNIFICANT CHANGE UP (ref 32–36)
MCV RBC AUTO: 93.9 FL — SIGNIFICANT CHANGE UP (ref 80–100)
PLATELET # BLD AUTO: 173 K/UL — SIGNIFICANT CHANGE UP (ref 150–400)
POTASSIUM SERPL-MCNC: 4.1 MMOL/L — SIGNIFICANT CHANGE UP (ref 3.5–5.3)
POTASSIUM SERPL-SCNC: 4.1 MMOL/L — SIGNIFICANT CHANGE UP (ref 3.5–5.3)
RBC # BLD: 3.81 M/UL — LOW (ref 4.2–5.8)
RBC # FLD: 12.6 % — SIGNIFICANT CHANGE UP (ref 10.3–14.5)
SODIUM SERPL-SCNC: 132 MMOL/L — LOW (ref 135–145)
WBC # BLD: 7 K/UL — SIGNIFICANT CHANGE UP (ref 3.8–10.5)
WBC # FLD AUTO: 7 K/UL — SIGNIFICANT CHANGE UP (ref 3.8–10.5)

## 2017-06-24 PROCEDURE — 99232 SBSQ HOSP IP/OBS MODERATE 35: CPT | Mod: GC

## 2017-06-24 RX ADMIN — HALOPERIDOL DECANOATE 0.25 MILLIGRAM(S): 100 INJECTION INTRAMUSCULAR at 17:53

## 2017-06-24 RX ADMIN — HALOPERIDOL DECANOATE 0.25 MILLIGRAM(S): 100 INJECTION INTRAMUSCULAR at 05:47

## 2017-06-24 RX ADMIN — Medication 75 MICROGRAM(S): at 05:47

## 2017-06-24 RX ADMIN — SIMVASTATIN 20 MILLIGRAM(S): 20 TABLET, FILM COATED ORAL at 21:57

## 2017-06-24 RX ADMIN — TAMSULOSIN HYDROCHLORIDE 0.4 MILLIGRAM(S): 0.4 CAPSULE ORAL at 21:57

## 2017-06-24 RX ADMIN — Medication 81 MILLIGRAM(S): at 11:59

## 2017-06-24 RX ADMIN — FINASTERIDE 5 MILLIGRAM(S): 5 TABLET, FILM COATED ORAL at 11:59

## 2017-06-24 NOTE — PROGRESS NOTE ADULT - ASSESSMENT
89M w/ PMHx of symptomatic severe AS (not operative candidate), HTN, HLD, CAD (s/p PCI), hypothyroidism, GERD, BPH, Dementia (AAOx1), and prior UTI, p/w acute agitation and hematuria after pulling Carrion out in setting of chronic progressive dementia.

## 2017-06-24 NOTE — PROGRESS NOTE ADULT - SUBJECTIVE AND OBJECTIVE BOX
Patient is a 89y old  Male who presents with a chief complaint of Agitation, hematuria (2017 11:53)      SUBJECTIVE / OVERNIGHT EVENTS:  No acute events overnight.  Daughter at bedside, states that patient slept well overnight, was not agitated.   No additional haldol given.       MEDICATIONS  (STANDING):  cefTRIAXone   IVPB 1Gram(s) IV Intermittent every 24 hours  levothyroxine 75MICROGram(s) Oral daily  finasteride 5milliGRAM(s) Oral daily  tamsulosin 0.4milliGRAM(s) Oral at bedtime  haloperidol     Tablet 0.25milliGRAM(s) Oral two times a day  aspirin enteric coated 81milliGRAM(s) Oral daily  simvastatin 20milliGRAM(s) Oral at bedtime    MEDICATIONS  (PRN):  haloperidol    Injectable 0.125milliGRAM(s) IV Push every 6 hours PRN Agitation      Vital Signs Last 24 Hrs  T(C): 36.4, Max: 36.7 (- @ 01:04)  HR: 91 (86 - 91)  BP: 117/74 (106/62 - 144/81)  RR: 18 (18 - 18)  SpO2: 100% (96% - 100%)  CAPILLARY BLOOD GLUCOSE      I&O's Summary    I & Os for current day (as of 2017 08:56)  =============================================  IN: 560 ml / OUT: 1150 ml / NET: -590 ml      PHYSICAL EXAM:  GENERAL: NAD, well-developed  HEAD:  Atraumatic, Normocephalic  EYES: EOMI, conjunctiva and sclera clear  NECK: Supple, No JVD  CHEST/LUNG: Clear to auscultation bilaterally; No wheeze  HEART: Regular rate and rhythm; 3/6 systolic murmur   ABDOMEN: Soft, Nontender, Nondistended; Bowel sounds present  EXTREMITIES:  No clubbing, cyanosis, or edema  : contreras in place draining red-tinged fluid  PSYCH: AAOx1  SKIN: No rashes or lesions      LABS:                        11.7   7.0   )-----------( 173      ( 2017 07:19 )             35.8     -    132<L>  |  96  |  19  ----------------------------<  104<H>  4.1   |  25  |  0.84    Ca    9.6      2017 07:19  Phos  3.1     -  Mg     2.0         TPro  6.6  /  Alb  3.5  /  TBili  0.7  /  DBili  x   /  AST  18  /  ALT  9<L>  /  AlkPhos  55            Urinalysis Basic - ( 2017 11:37 )    Color: Red / Appearance: Turbid / S.010 / pH: x  Gluc: x / Ketone: Negative  / Bili: Negative / Urobili: Negative   Blood: x / Protein: 300 mg/dL / Nitrite: Negative   Leuk Esterase: Small / RBC: >50 /HPF / WBC 11-25 /HPF   Sq Epi: x / Non Sq Epi: x / Bacteria: x      Consultant(s) Notes Reviewed:  Psychiatry

## 2017-06-24 NOTE — PROGRESS NOTE ADULT - ATTENDING COMMENTS
Pt initially presented with hematuria after pulling out contreras; UA was positive, but urine culture negative, and positive UA was in setting on trauma; pt is afebrile, asymptomatic, and no leukocytosis, will d/c abx for now, and monitor closely for signs of infection; urology replaced pt's contreras catheter    Hb has been stable, no signs of major bleeding, or continued hematuria'; would cont ASA for now and f/u with urology if hematuria recurred for possible role of CBI    Pt has worsening dementia, and family can't care for him at home; pt is pending rehab vs SNF    SHILA Marshall, Attending Physician

## 2017-06-24 NOTE — PROGRESS NOTE ADULT - PROBLEM SELECTOR PLAN 3
Delirium and metabolic encephalopathy  -Behavioral health assessment recs seen  -Haldol 0.25mg PO BID standing with Haldol 0.125 IVP q6h PRN for agitation  -Reorientation  -Family member at bedside if possible  -Fall precautions

## 2017-06-24 NOTE — PROGRESS NOTE ADULT - PROBLEM SELECTOR PLAN 6
-no s/s of overt heart failure at this time, no significant edema on exam  -pt no longer on diuretics as outpatient  -not candidate for surgery  -currently SBP <120 so no need for diuretics or ace-i  -pt on ASA and statin

## 2017-06-24 NOTE — PROGRESS NOTE ADULT - PROBLEM SELECTOR PLAN 2
-In setting of traumatic Carrion removal  -Monitor Hb closely  -No HSQ  -If patient develops MINA would d/w  need for CBI  -Pt is on ASA 81 w/ h/o CAD s/p PCI - will c/w ASA 81 for now - D/C if hematuria worsens  -Urology following, Carrion replaced in ED

## 2017-06-24 NOTE — PROGRESS NOTE ADULT - PROBLEM SELECTOR PLAN 1
-UA+, negative urine culture   - would d/c antibiotics given negative urine culture and positive urinalysis likely in setting of trauma

## 2017-06-25 LAB
ANION GAP SERPL CALC-SCNC: 9 MMOL/L — SIGNIFICANT CHANGE UP (ref 5–17)
BUN SERPL-MCNC: 18 MG/DL — SIGNIFICANT CHANGE UP (ref 7–23)
CALCIUM SERPL-MCNC: 9.9 MG/DL — SIGNIFICANT CHANGE UP (ref 8.4–10.5)
CHLORIDE SERPL-SCNC: 96 MMOL/L — SIGNIFICANT CHANGE UP (ref 96–108)
CO2 SERPL-SCNC: 28 MMOL/L — SIGNIFICANT CHANGE UP (ref 22–31)
CREAT SERPL-MCNC: 0.83 MG/DL — SIGNIFICANT CHANGE UP (ref 0.5–1.3)
GLUCOSE SERPL-MCNC: 96 MG/DL — SIGNIFICANT CHANGE UP (ref 70–99)
HCT VFR BLD CALC: 33.4 % — LOW (ref 39–50)
HGB BLD-MCNC: 11.5 G/DL — LOW (ref 13–17)
MAGNESIUM SERPL-MCNC: 2.1 MG/DL — SIGNIFICANT CHANGE UP (ref 1.6–2.6)
MCHC RBC-ENTMCNC: 32.3 PG — SIGNIFICANT CHANGE UP (ref 27–34)
MCHC RBC-ENTMCNC: 34.4 GM/DL — SIGNIFICANT CHANGE UP (ref 32–36)
MCV RBC AUTO: 93.7 FL — SIGNIFICANT CHANGE UP (ref 80–100)
PHOSPHATE SERPL-MCNC: 3.3 MG/DL — SIGNIFICANT CHANGE UP (ref 2.5–4.5)
PLATELET # BLD AUTO: 164 K/UL — SIGNIFICANT CHANGE UP (ref 150–400)
POTASSIUM SERPL-MCNC: 4.2 MMOL/L — SIGNIFICANT CHANGE UP (ref 3.5–5.3)
POTASSIUM SERPL-SCNC: 4.2 MMOL/L — SIGNIFICANT CHANGE UP (ref 3.5–5.3)
RBC # BLD: 3.57 M/UL — LOW (ref 4.2–5.8)
RBC # FLD: 12.4 % — SIGNIFICANT CHANGE UP (ref 10.3–14.5)
SODIUM SERPL-SCNC: 133 MMOL/L — LOW (ref 135–145)
WBC # BLD: 6.3 K/UL — SIGNIFICANT CHANGE UP (ref 3.8–10.5)
WBC # FLD AUTO: 6.3 K/UL — SIGNIFICANT CHANGE UP (ref 3.8–10.5)

## 2017-06-25 PROCEDURE — 99232 SBSQ HOSP IP/OBS MODERATE 35: CPT | Mod: GC

## 2017-06-25 RX ADMIN — Medication 75 MICROGRAM(S): at 06:10

## 2017-06-25 RX ADMIN — HALOPERIDOL DECANOATE 0.25 MILLIGRAM(S): 100 INJECTION INTRAMUSCULAR at 17:21

## 2017-06-25 RX ADMIN — TAMSULOSIN HYDROCHLORIDE 0.4 MILLIGRAM(S): 0.4 CAPSULE ORAL at 21:38

## 2017-06-25 RX ADMIN — FINASTERIDE 5 MILLIGRAM(S): 5 TABLET, FILM COATED ORAL at 11:10

## 2017-06-25 RX ADMIN — Medication 81 MILLIGRAM(S): at 11:10

## 2017-06-25 RX ADMIN — SIMVASTATIN 20 MILLIGRAM(S): 20 TABLET, FILM COATED ORAL at 21:38

## 2017-06-25 RX ADMIN — HALOPERIDOL DECANOATE 0.25 MILLIGRAM(S): 100 INJECTION INTRAMUSCULAR at 06:10

## 2017-06-25 NOTE — PROGRESS NOTE ADULT - PROBLEM SELECTOR PLAN 6
-no s/s of overt heart failure at this time, no significant edema on exam  -pt no longer on diuretics as outpatient  -not candidate for surgery  -currently SBP <120 so no need for diuretics or ace-i  -pt on ASA and statin -c/w Synthroid  -TSH on 6/23 found to be 1.57

## 2017-06-25 NOTE — PROGRESS NOTE ADULT - SUBJECTIVE AND OBJECTIVE BOX
HPI:  89M w/ PMHx of symptomatic AS (not operative candidate), HTN, HLD, CAD (s/p PCI), Hypothyroid, GERD, BPH, Dementia (baseline MS unknown), and prior UTI, p/w acute agitation and hematuria s/p pulling contreras out.  Pt lives with daughter Tiffanie and at baseline has dementia, with noted gradual decline over last few months. A few days prior to admission patient had Contreras cath placed by Uro for BPH/retention and soon after it was placed he was noted to have acute agitation above baseline.   Noted to have weight loss over last few months, otherwise asymptomatic.           Extensive GOC d/w family in ER; pt has endorsed in will in the past that he wants to be DNR/DNI; family has dutifully taken care of him at home but believes that after this current hospitalization, they may need to place pt in nursing home.      ED Vitals: 97.5, 110, 111/65, 16, 100% RA.     Labs: no leukocytosis, chronic stable anemia (hgb 11.2), chronic stable mild hyponatremia (133), UA+.  CTH without acute changes but atrophy and white matter ischemic disease seen, progressed since 2011. CXR with clear lungs. Given ceftriaxone in ED.  Seen by Urology, Contreras re-placed in ER, 400 cc red urine out. Pt endorsing wish to "kill himself" 2/2 hospitalization and issues with contreras; seen by Behavioral Health in ED; noted with no prior psychiatric history, no h/o suicidality. No h/o substance abuse, behavior thought 2/2 delirium in setting of UTI.     Note: Med rec performed by me (22 Jun 2017 16:29)    REVIEW OF SYSTEMS:    CONSTITUTIONAL: No weakness, fevers or chills  EYES/ENT: No visual changes, no throat pain   RESPIRATORY: No cough, wheezing, hemoptysis; No shortness of breath  CARDIOVASCULAR: No chest pain or palpitations  GASTROINTESTINAL: No abdominal, nausea, vomiting, or hematemesis; No diarrhea or constipation. No melena or hematochezia.  GENITOURINARY: No dysuria, frequency or hematuria  NEUROLOGICAL: No dizziness, numbness, or weakness  SKIN: No itching, burning, rashes, or lesions   All other review of systems is negative unless indicated above.    VITAL SIGNS:  Vital Signs Last 24 Hrs  T(C): 36.3, Max: 36.7 (06-24 @ 21:33)  T(F): 97.3, Max: 98.1 (06-24 @ 21:33)  HR: 96 (76 - 96)  BP: 106/73 (106/73 - 144/85)  BP(mean): --  RR: 18 (18 - 18)  SpO2: 97% (97% - 98%)      PHYSICAL EXAM:     GENERAL: no acute distress  HEENT: NC/AT, EOMI, neck supple, MMM  RESPIRATORY: LCTAB/L, no rhonchi, rales, or wheezing  CARDIOVASCULAR: RRR, no murmurs, gallops, rubs  ABDOMINAL: soft, non-tender, non-distended, positive bowel sounds   EXTREMITIES: no clubbing, cyanosis, or edema  NEUROLOGICAL: alert and oriented x 3, non-focal  SKIN: no rashes or lesions   MUSCULOSKELETAL: no gross joint deformity                          11.5   6.3   )-----------( 164      ( 25 Jun 2017 07:11 )             33.4     06-25    133<L>  |  96  |  18  ----------------------------<  96  4.2   |  28  |  0.83    Ca    9.9      25 Jun 2017 07:11  Phos  3.3     06-25  Mg     2.1     06-25        CAPILLARY BLOOD GLUCOSE      MEDICATIONS  (STANDING):  levothyroxine 75MICROGram(s) Oral daily  finasteride 5milliGRAM(s) Oral daily  tamsulosin 0.4milliGRAM(s) Oral at bedtime  haloperidol     Tablet 0.25milliGRAM(s) Oral two times a day  aspirin enteric coated 81milliGRAM(s) Oral daily  simvastatin 20milliGRAM(s) Oral at bedtime Overnight Events:  Patient seen and examined at bedside.  No acute overnight events.  No pain or discomfort.  Patient slept well overnight.  No nausea or vomiting.  No fevers/chills.      HPI:  89M w/ PMHx of symptomatic AS (not operative candidate), HTN, HLD, CAD (s/p PCI), Hypothyroid, GERD, BPH, Dementia (baseline MS unknown), and prior UTI, presented with acute agitation and hematuria s/p pulling his contreras out.  Pt lives with daughter Tiffanie and at baseline has dementia, with noted gradual decline over last few months. A few days prior to admission patient had Contreras cath placed by Uro for BPH/retention and soon after it was placed he was noted to have acute agitation above baseline.   Noted to have weight loss over last few months, otherwise asymptomatic.  Extensive GOC d/w family; pt has endorsed in will in the past that he wants to be DNR/DNI; family has dutifully taken care of him at home but believes that after this current hospitalization, they may need to place pt in nursing home.        REVIEW OF SYSTEMS:  Patient states that he is not in pain but otherwise ROS are difficult to assess due to patient's altered mental status.    VITAL SIGNS:  Vital Signs Last 24 Hrs  T(C): 36.3, Max: 36.7 (06-24 @ 21:33)  T(F): 97.3, Max: 98.1 (06-24 @ 21:33)  HR: 96 (76 - 96)  BP: 106/73 (106/73 - 144/85)  BP(mean): --  RR: 18 (18 - 18)  SpO2: 97% (97% - 98%)      PHYSICAL EXAM:     GENERAL: NAD, well-developed  HEAD:  Atraumatic, Normocephalic  EYES: conjunctiva and sclera clear  NECK: Supple, No JVD  CHEST/LUNG: Clear to auscultation bilaterally; No wheeze  HEART: Regular rate and rhythm; 3/6 systolic murmur   ABDOMEN: Soft, Nontender, Nondistended; Bowel sounds present  EXTREMITIES:  No clubbing, cyanosis, or edema  : contreras in place draining light yellow fluid  PSYCH: AAOx1 (only to self)  SKIN: No rashes or lesions                          11.5   6.3   )-----------( 164      ( 25 Jun 2017 07:11 )             33.4     06-25    133<L>  |  96  |  18  ----------------------------<  96  4.2   |  28  |  0.83    Ca    9.9      25 Jun 2017 07:11  Phos  3.3     06-25  Mg     2.1     06-25          MEDICATIONS  (STANDING):  levothyroxine 75MICROGram(s) Oral daily  finasteride 5milliGRAM(s) Oral daily  tamsulosin 0.4milliGRAM(s) Oral at bedtime  haloperidol     Tablet 0.25milliGRAM(s) Oral two times a day  aspirin enteric coated 81milliGRAM(s) Oral daily  simvastatin 20milliGRAM(s) Oral at bedtime

## 2017-06-25 NOTE — PROGRESS NOTE ADULT - PROBLEM SELECTOR PLAN 3
Delirium and metabolic encephalopathy  -Behavioral health assessment recs seen  -Haldol 0.25mg PO BID standing with Haldol 0.125 IVP q6h PRN for agitation  -Reorientation  -Family member at bedside if possible  -Fall precautions -c/w Flomax and Finasteride  -c/w Sheyla, Urology following

## 2017-06-25 NOTE — PHYSICAL THERAPY INITIAL EVALUATION ADULT - PERTINENT HX OF CURRENT PROBLEM, REHAB EVAL
89M w/ PMH of symptomatic AS (not operative candidate), HTN, HLD, hypothyroid, GERD, BPH, dementia, and prior UTI, p/w acute agitation and hematuria s/p pulling contreras out in setting of chronic progressive dementia, Results shows decreased h/h, CT head: No CT evidence of acute intracranial hemorrhage or mass effect.  Atrophy has moderately progressed from 6/6/2011

## 2017-06-25 NOTE — PROGRESS NOTE ADULT - PROBLEM SELECTOR PROBLEM 1
Urinary tract infection associated with catheterization of urinary tract, unspecified indwelling urinary catheter type, subsequent encounter Hematuria, unspecified

## 2017-06-25 NOTE — PROGRESS NOTE ADULT - PROBLEM SELECTOR PLAN 7
-c/w Synthroid -ICDs as pt with hematuria  -PT/OT consult ordered and we are waiting on this prior to d/c

## 2017-06-25 NOTE — PHYSICAL THERAPY INITIAL EVALUATION ADULT - ADDITIONAL COMMENTS
Pt lived with his family, Able to ambulate with RW as baseline function, Pt is a poor historian unable to obtain more info.

## 2017-06-25 NOTE — PROGRESS NOTE ADULT - PROBLEM SELECTOR PLAN 2
-In setting of traumatic Carrion removal  -Monitor Hb closely  -No HSQ  -If patient develops MINA would d/w  need for CBI  -Pt is on ASA 81 w/ h/o CAD s/p PCI - will c/w ASA 81 for now - D/C if hematuria worsens  -Urology following, Carrion replaced in ED Delirium and metabolic encephalopathy  -Behavioral health assessment recs seen  -Haldol 0.25mg PO BID standing with Haldol 0.125 IVP q6h PRN for agitation

## 2017-06-25 NOTE — PROGRESS NOTE ADULT - ATTENDING COMMENTS
Pt initially presented with hematuria after pulling out contreras; UA was positive, but urine culture negative, and positive UA was in setting on trauma; pt is afebrile, asymptomatic, and no leukocytosis, antibiotics discontinued yesterday; monitor closely for signs of infection; urology replaced pt's contreras catheter    Hb has been stable, no signs of major bleeding, or continued hematuria'; would cont ASA for now and f/u with urology if hematuria recurred for possible role of CBI    Pt has worsening dementia, and family can't care for him at home; pt is pending rehab vs SNF    SHILA Marshall, Attending Physician

## 2017-06-25 NOTE — PROGRESS NOTE ADULT - PROBLEM SELECTOR PLAN 1
-UA+, negative urine culture   - would d/c antibiotics given negative urine culture and positive urinalysis likely in setting of trauma - In setting of traumatic Carrion removal  - Hb has been stable  - patient originally had UA+ but urine culture came back negative so ceftriaxone was d/c'ed  -If patient develops MINA would discuss with  the need for CBI  -Pt is on ASA 81 w/ h/o CAD s/p PCI - will c/w ASA 81 for now - D/C if hematuria reappears  -Urology following, Carrion replaced in ED

## 2017-06-25 NOTE — PROGRESS NOTE ADULT - ASSESSMENT
89M w/ PMHx of symptomatic severe AS (not operative candidate), HTN, HLD, CAD (s/p PCI), hypothyroidism, GERD, BPH, Dementia (AAOx1), and prior UTI, p/w acute agitation and hematuria after pulling Carrion out in setting of chronic progressive dementia. 89M w/ PMHx of symptomatic severe AS (not operative candidate), HTN, HLD, CAD (s/p PCI), hypothyroidism, GERD, BPH, Dementia (AAOx1), and prior UTI, p/w acute agitation and hematuria after pulling Carrion out in setting of chronic progressive dementia. Patient remains demented but urine color has since normalized.

## 2017-06-25 NOTE — PROGRESS NOTE ADULT - PROBLEM SELECTOR PLAN 4
-c/w Flomax and Finasteride  -c/w Sheyla, Urology following - TSH 1.57, B12 408, HIV nonreactive, RPR negative  - GOC in ER, proxy Tiffanie has signed DNR/DNI  - family amenable to placement in long term facility upon d/c

## 2017-06-25 NOTE — PROGRESS NOTE ADULT - PROBLEM SELECTOR PLAN 5
-check TSH, B12, HIV, RPR  -GOC in ER, proxy Tiffanie has signed DNR/DNI  -will sign out to day team to f/u with ; family amenable to placement in long term facility upon d/c -no s/s of overt heart failure at this time, no significant edema on exam  -pt no longer on diuretics as outpatient  -not candidate for surgery  -currently SBP <120 so no need for diuretics or ace-i  -pt on ASA and statin

## 2017-06-26 DIAGNOSIS — Z29.9 ENCOUNTER FOR PROPHYLACTIC MEASURES, UNSPECIFIED: ICD-10-CM

## 2017-06-26 DIAGNOSIS — R55 SYNCOPE AND COLLAPSE: ICD-10-CM

## 2017-06-26 LAB
ALBUMIN SERPL ELPH-MCNC: 3.7 G/DL — SIGNIFICANT CHANGE UP (ref 3.3–5)
ALP SERPL-CCNC: 55 U/L — SIGNIFICANT CHANGE UP (ref 40–120)
ALT FLD-CCNC: 11 U/L RC — SIGNIFICANT CHANGE UP (ref 10–45)
ANION GAP SERPL CALC-SCNC: 11 MMOL/L — SIGNIFICANT CHANGE UP (ref 5–17)
ANION GAP SERPL CALC-SCNC: 13 MMOL/L — SIGNIFICANT CHANGE UP (ref 5–17)
AST SERPL-CCNC: 20 U/L — SIGNIFICANT CHANGE UP (ref 10–40)
BILIRUB SERPL-MCNC: 0.6 MG/DL — SIGNIFICANT CHANGE UP (ref 0.2–1.2)
BUN SERPL-MCNC: 17 MG/DL — SIGNIFICANT CHANGE UP (ref 7–23)
BUN SERPL-MCNC: 20 MG/DL — SIGNIFICANT CHANGE UP (ref 7–23)
CALCIUM SERPL-MCNC: 9.5 MG/DL — SIGNIFICANT CHANGE UP (ref 8.4–10.5)
CALCIUM SERPL-MCNC: 9.6 MG/DL — SIGNIFICANT CHANGE UP (ref 8.4–10.5)
CHLORIDE SERPL-SCNC: 94 MMOL/L — LOW (ref 96–108)
CHLORIDE SERPL-SCNC: 96 MMOL/L — SIGNIFICANT CHANGE UP (ref 96–108)
CO2 SERPL-SCNC: 25 MMOL/L — SIGNIFICANT CHANGE UP (ref 22–31)
CO2 SERPL-SCNC: 25 MMOL/L — SIGNIFICANT CHANGE UP (ref 22–31)
CREAT SERPL-MCNC: 0.69 MG/DL — SIGNIFICANT CHANGE UP (ref 0.5–1.3)
CREAT SERPL-MCNC: 0.83 MG/DL — SIGNIFICANT CHANGE UP (ref 0.5–1.3)
GLUCOSE SERPL-MCNC: 128 MG/DL — HIGH (ref 70–99)
GLUCOSE SERPL-MCNC: 88 MG/DL — SIGNIFICANT CHANGE UP (ref 70–99)
HCT VFR BLD CALC: 35.4 % — LOW (ref 39–50)
HCT VFR BLD CALC: 35.9 % — LOW (ref 39–50)
HGB BLD-MCNC: 12.3 G/DL — LOW (ref 13–17)
HGB BLD-MCNC: 12.5 G/DL — LOW (ref 13–17)
MCHC RBC-ENTMCNC: 31.9 PG — SIGNIFICANT CHANGE UP (ref 27–34)
MCHC RBC-ENTMCNC: 32.9 PG — SIGNIFICANT CHANGE UP (ref 27–34)
MCHC RBC-ENTMCNC: 34.3 GM/DL — SIGNIFICANT CHANGE UP (ref 32–36)
MCHC RBC-ENTMCNC: 35.3 GM/DL — SIGNIFICANT CHANGE UP (ref 32–36)
MCV RBC AUTO: 93 FL — SIGNIFICANT CHANGE UP (ref 80–100)
MCV RBC AUTO: 93.3 FL — SIGNIFICANT CHANGE UP (ref 80–100)
PLATELET # BLD AUTO: 167 K/UL — SIGNIFICANT CHANGE UP (ref 150–400)
PLATELET # BLD AUTO: 176 K/UL — SIGNIFICANT CHANGE UP (ref 150–400)
POTASSIUM SERPL-MCNC: 4 MMOL/L — SIGNIFICANT CHANGE UP (ref 3.5–5.3)
POTASSIUM SERPL-MCNC: 4 MMOL/L — SIGNIFICANT CHANGE UP (ref 3.5–5.3)
POTASSIUM SERPL-SCNC: 4 MMOL/L — SIGNIFICANT CHANGE UP (ref 3.5–5.3)
POTASSIUM SERPL-SCNC: 4 MMOL/L — SIGNIFICANT CHANGE UP (ref 3.5–5.3)
PROT SERPL-MCNC: 6.4 G/DL — SIGNIFICANT CHANGE UP (ref 6–8.3)
RBC # BLD: 3.8 M/UL — LOW (ref 4.2–5.8)
RBC # BLD: 3.87 M/UL — LOW (ref 4.2–5.8)
RBC # FLD: 12.3 % — SIGNIFICANT CHANGE UP (ref 10.3–14.5)
RBC # FLD: 12.4 % — SIGNIFICANT CHANGE UP (ref 10.3–14.5)
SODIUM SERPL-SCNC: 132 MMOL/L — LOW (ref 135–145)
SODIUM SERPL-SCNC: 132 MMOL/L — LOW (ref 135–145)
WBC # BLD: 6.4 K/UL — SIGNIFICANT CHANGE UP (ref 3.8–10.5)
WBC # BLD: 8 K/UL — SIGNIFICANT CHANGE UP (ref 3.8–10.5)
WBC # FLD AUTO: 6.4 K/UL — SIGNIFICANT CHANGE UP (ref 3.8–10.5)
WBC # FLD AUTO: 8 K/UL — SIGNIFICANT CHANGE UP (ref 3.8–10.5)

## 2017-06-26 PROCEDURE — 99233 SBSQ HOSP IP/OBS HIGH 50: CPT | Mod: GC

## 2017-06-26 PROCEDURE — 70450 CT HEAD/BRAIN W/O DYE: CPT | Mod: 26

## 2017-06-26 PROCEDURE — 93010 ELECTROCARDIOGRAM REPORT: CPT

## 2017-06-26 RX ADMIN — Medication 75 MICROGRAM(S): at 06:34

## 2017-06-26 RX ADMIN — TAMSULOSIN HYDROCHLORIDE 0.4 MILLIGRAM(S): 0.4 CAPSULE ORAL at 22:11

## 2017-06-26 RX ADMIN — FINASTERIDE 5 MILLIGRAM(S): 5 TABLET, FILM COATED ORAL at 12:21

## 2017-06-26 RX ADMIN — Medication 81 MILLIGRAM(S): at 12:22

## 2017-06-26 RX ADMIN — HALOPERIDOL DECANOATE 0.25 MILLIGRAM(S): 100 INJECTION INTRAMUSCULAR at 06:34

## 2017-06-26 RX ADMIN — SIMVASTATIN 20 MILLIGRAM(S): 20 TABLET, FILM COATED ORAL at 22:11

## 2017-06-26 RX ADMIN — HALOPERIDOL DECANOATE 0.25 MILLIGRAM(S): 100 INJECTION INTRAMUSCULAR at 17:20

## 2017-06-26 NOTE — PROGRESS NOTE ADULT - PROBLEM SELECTOR PLAN 1
- In setting of traumatic Carrion removal  - Hb has been stable and urine now normal in color  - patient originally had UA+ but urine culture came back negative so ceftriaxone was d/c'ed  -If patient develops MINA would discuss with  the need for CBI  -Pt is on ASA 81 w/ h/o CAD s/p PCI - will c/w ASA 81 for now - D/C if hematuria reappears  -Urology following, Carrion replaced in ED At around 2PM the patient's family was at bedside when they called for help because the patient had an episode of syncope while sitting in his chair  - Ordered for CT w/o contrast, CMP, CBC, EKG

## 2017-06-26 NOTE — CHART NOTE - NSCHARTNOTEFT_GEN_A_CORE
Was called in because pt become unresponsive by chair. Came to asses the patient. Patient unresponsive initially. Had a pulse. Vitals were 97.3, 83, 117/72, 18, 100 RA. Pt became responsive after 2 minutes, A & O X 1 (baseline). Will obtain STAB CBC, BMP, 12 lead, CT head.                 Dewayne Mendoza, PGY3  [phone] Was called in because pt become unresponsive by chair. Came to asses the patient. Patient unresponsive initially. Had a pulse. Vitals were 97.3, 83, 117/72, 18, 100 RA. Pt became responsive after 2 minutes, A & O X 1 (baseline). Will obtain STAB CBC, BMP, 12 lead, CT head.     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

## 2017-06-26 NOTE — PROGRESS NOTE ADULT - PROBLEM SELECTOR PLAN 5
-no s/s of overt heart failure at this time, no significant edema on exam  -pt no longer on diuretics as outpatient  -not candidate for surgery  -currently SBP <120 so no need for diuretics or ace-i  -pt on ASA and statin - TSH 1.57, B12 408, HIV nonreactive, RPR negative  - GOC in ER, proxy Tiffanie has signed DNR/DNI  - family amenable to placement in long term facility upon d/c - PT has agreed to subacute rehab

## 2017-06-26 NOTE — PROGRESS NOTE ADULT - ASSESSMENT
89M w/ PMHx of symptomatic severe AS (not operative candidate), HTN, HLD, CAD (s/p PCI), hypothyroidism, GERD, BPH, Dementia (AAOx1), and prior UTI, p/w acute agitation and hematuria after pulling Carrion out in setting of chronic progressive dementia. Patient remains demented but urine color has since normalized.

## 2017-06-26 NOTE — PROGRESS NOTE ADULT - PROBLEM SELECTOR PLAN 2
Delirium and metabolic encephalopathy  -Behavioral health assessment recs seen  -Haldol 0.25mg PO BID standing with Haldol 0.125 IVP q6h PRN for agitation - In setting of traumatic Carrion removal  - Hb has been stable and urine now normal in color  - patient originally had UA+ but urine culture came back negative so ceftriaxone was d/c'ed  -If patient develops MINA would discuss with  the need for CBI  -Pt is on ASA 81 w/ h/o CAD s/p PCI - will c/w ASA 81 for now - D/C if hematuria reappears  -Urology following, Carrion replaced in ED

## 2017-06-26 NOTE — PROGRESS NOTE ADULT - PROBLEM SELECTOR PLAN 6
-c/w Synthroid  -TSH on 6/23 found to be 1.57 -no s/s of overt heart failure at this time, no significant edema on exam  -pt no longer on diuretics as outpatient  -not candidate for surgery  -currently SBP <120 so no need for diuretics or ace-i  -pt on ASA and statin

## 2017-06-26 NOTE — PROGRESS NOTE ADULT - PROBLEM SELECTOR PLAN 7
-ICDs as pt with hematuria  -PT/OT consult ordered and we are waiting on this prior to d/c -c/w Synthroid  -TSH on 6/23 found to be 1.57

## 2017-06-26 NOTE — PROGRESS NOTE ADULT - PROBLEM SELECTOR PLAN 4
- TSH 1.57, B12 408, HIV nonreactive, RPR negative  - GOC in ER, proxy Tiffanie has signed DNR/DNI  - family amenable to placement in long term facility upon d/c - PT has agreed to subacute rehab -c/w Flomax and Finasteride  -c/w Carrion  - spoke to his PCP (Dr. Lynn [phone]) who said that he is planning to see him soon for outpatient void trial so we will allow this to be done outpatient

## 2017-06-26 NOTE — PROGRESS NOTE ADULT - SUBJECTIVE AND OBJECTIVE BOX
Patient seen and examined at bedside.  No acute overnight events.  No pain or discomfort.  Patient slept well overnight.  Normal urine and stooling patterns. No nausea or vomiting.  Normal appetite.  No fevers/chills.  No SOB or chest pain.    HPI:  89M w/ PMHx of symptomatic AS (not operative candidate), HTN, HLD, CAD (s/p PCI), Hypothyroid, GERD, BPH, Dementia (baseline MS unknown), and prior UTI, presented with acute agitation and hematuria s/p pulling his contreras out.  Pt lives with daughter Tiffanie and at baseline has dementia, with noted gradual decline over last few months. A few days prior to admission patient had Contreras cath placed by Uro for BPH/retention and soon after it was placed he was noted to have acute agitation above baseline.   Noted to have weight loss over last few months, otherwise asymptomatic.  Extensive GOC d/w family; pt has endorsed in will in the past that he wants to be DNR/DNI; family has dutifully taken care of him at home but believes that after this current hospitalization, they may need to place pt in nursing home.        REVIEW OF SYSTEMS:  Patient states that he is not in pain but otherwise ROS are difficult to assess due to patient's altered mental status.      VITAL SIGNS:  Vital Signs Last 24 Hrs  T(C): 36.5, Max: 36.6 (06-25 @ 12:36)  T(F): 97.7, Max: 97.9 (06-25 @ 12:36)  HR: 87 (80 - 87)  BP: 120/75 (120/75 - 154/90)  BP(mean): --  RR: 18 (18 - 18)  SpO2: 100% (94% - 100%)      PHYSICAL EXAM:     GENERAL: NAD, well-developed  HEAD:  Atraumatic, Normocephalic  EYES: conjunctiva and sclera clear  NECK: Supple, No JVD  CHEST/LUNG: Clear to auscultation bilaterally; No wheeze  HEART: Regular rate and rhythm; 3/6 systolic murmur   ABDOMEN: Soft, Nontender, Nondistended; Bowel sounds present  EXTREMITIES:  No clubbing, cyanosis, or edema  : contreras in place draining light yellow fluid  PSYCH: AAOx1 (only to self)  SKIN: No rashes or lesions                            12.3   6.4   )-----------( 167      ( 26 Jun 2017 07:07 )             35.9     06-26    132<L>  |  96  |  17  ----------------------------<  88  4.0   |  25  |  0.69    Ca    9.5      26 Jun 2017 07:07  Phos  3.3     06-25  Mg     2.1     06-25        CAPILLARY BLOOD GLUCOSE      MEDICATIONS  (STANDING):  levothyroxine 75MICROGram(s) Oral daily  finasteride 5milliGRAM(s) Oral daily  tamsulosin 0.4milliGRAM(s) Oral at bedtime  haloperidol     Tablet 0.25milliGRAM(s) Oral two times a day  aspirin enteric coated 81milliGRAM(s) Oral daily  simvastatin 20milliGRAM(s) Oral at bedtime Patient seen and examined at bedside.  No acute overnight events.  No pain or discomfort.  Patient slept well overnight.  No nausea or vomiting.  No fevers/chills.    HPI:  89M w/ PMHx of symptomatic AS (not operative candidate), HTN, HLD, CAD (s/p PCI), Hypothyroid, GERD, BPH, Dementia (baseline MS unknown), and prior UTI, presented with acute agitation and hematuria s/p pulling his contreras out.  Pt lives with daughter Tiffanie and at baseline has dementia, with noted gradual decline over last few months. A few days prior to admission patient had Contreras cath placed by Uro for BPH/retention and soon after it was placed he was noted to have acute agitation above baseline.   Noted to have weight loss over last few months, otherwise asymptomatic.  Extensive GOC d/w family; pt has endorsed in will in the past that he wants to be DNR/DNI; family has dutifully taken care of him at home but believes that after this current hospitalization, they may need to place pt in nursing home.        REVIEW OF SYSTEMS:  Patient states that he is not in pain but otherwise ROS are difficult to assess due to patient's altered mental status.      VITAL SIGNS:  Vital Signs Last 24 Hrs  T(C): 36.5, Max: 36.6 (06-25 @ 12:36)  T(F): 97.7, Max: 97.9 (06-25 @ 12:36)  HR: 87 (80 - 87)  BP: 120/75 (120/75 - 154/90)  BP(mean): --  RR: 18 (18 - 18)  SpO2: 100% (94% - 100%)      PHYSICAL EXAM:     GENERAL: NAD, well-developed  HEAD:  Atraumatic, Normocephalic  EYES: conjunctiva and sclera clear  NECK: Supple, No JVD  CHEST/LUNG: Clear to auscultation bilaterally; No wheeze  HEART: Regular rate and rhythm; 3/6 systolic murmur   ABDOMEN: Soft, Nontender, Nondistended; Bowel sounds present  EXTREMITIES:  No clubbing, cyanosis, or edema  : contreras in place draining light yellow fluid  PSYCH: AAOx1 (only to self)  SKIN: No rashes or lesions                            12.3   6.4   )-----------( 167      ( 26 Jun 2017 07:07 )             35.9     06-26    132<L>  |  96  |  17  ----------------------------<  88  4.0   |  25  |  0.69    Ca    9.5      26 Jun 2017 07:07  Phos  3.3     06-25  Mg     2.1     06-25        CAPILLARY BLOOD GLUCOSE      MEDICATIONS  (STANDING):  levothyroxine 75MICROGram(s) Oral daily  finasteride 5milliGRAM(s) Oral daily  tamsulosin 0.4milliGRAM(s) Oral at bedtime  haloperidol     Tablet 0.25milliGRAM(s) Oral two times a day  aspirin enteric coated 81milliGRAM(s) Oral daily  simvastatin 20milliGRAM(s) Oral at bedtime Patient seen and examined at bedside.  No acute overnight events.  No pain or discomfort.  Patient slept well overnight.  No nausea or vomiting.   At around 2PM the patient's family was at bedside when they called for help because the patient had an episode of syncope while sitting in his chair.    HPI:  89M w/ PMHx of symptomatic AS (not operative candidate), HTN, HLD, CAD (s/p PCI), Hypothyroid, GERD, BPH, Dementia (baseline MS unknown), and prior UTI, presented with acute agitation and hematuria s/p pulling his contreras out.  Pt lives with daughter Tiffanie and at baseline has dementia, with noted gradual decline over last few months. A few days prior to admission patient had Contreras cath placed by Uro for BPH/retention and soon after it was placed he was noted to have acute agitation above baseline.   Noted to have weight loss over last few months, otherwise asymptomatic.  Extensive GOC d/w family; pt has endorsed in will in the past that he wants to be DNR/DNI; family has dutifully taken care of him at home but believes that after this current hospitalization, they may need to place pt in nursing home.        REVIEW OF SYSTEMS:  Patient states that he is not in pain but otherwise ROS are difficult to assess due to patient's altered mental status.      VITAL SIGNS:  Vital Signs Last 24 Hrs  T(C): 36.5, Max: 36.6 (06-25 @ 12:36)  T(F): 97.7, Max: 97.9 (06-25 @ 12:36)  HR: 87 (80 - 87)  BP: 120/75 (120/75 - 154/90)  BP(mean): --  RR: 18 (18 - 18)  SpO2: 100% (94% - 100%)      PHYSICAL EXAM:     GENERAL: NAD, well-developed  HEAD:  Atraumatic, Normocephalic  EYES: conjunctiva and sclera clear  NECK: Supple, No JVD  CHEST/LUNG: Clear to auscultation bilaterally; No wheeze  HEART: Regular rate and rhythm; 3/6 systolic murmur   ABDOMEN: Soft, Nontender, Nondistended; Bowel sounds present  EXTREMITIES:  No clubbing, cyanosis, or edema  : contreras in place draining light yellow fluid  PSYCH: AAOx1 (only to self)  SKIN: No rashes or lesions                            12.3   6.4   )-----------( 167      ( 26 Jun 2017 07:07 )             35.9     06-26    132<L>  |  96  |  17  ----------------------------<  88  4.0   |  25  |  0.69    Ca    9.5      26 Jun 2017 07:07  Phos  3.3     06-25  Mg     2.1     06-25        CAPILLARY BLOOD GLUCOSE      MEDICATIONS  (STANDING):  levothyroxine 75MICROGram(s) Oral daily  finasteride 5milliGRAM(s) Oral daily  tamsulosin 0.4milliGRAM(s) Oral at bedtime  haloperidol     Tablet 0.25milliGRAM(s) Oral two times a day  aspirin enteric coated 81milliGRAM(s) Oral daily  simvastatin 20milliGRAM(s) Oral at bedtime

## 2017-06-26 NOTE — PROGRESS NOTE ADULT - PROBLEM SELECTOR PLAN 8
- none due to recent hematuria  DISPO: as per PT plan is to send to [facility] -ICDs as pt with hematuria  -PT/OT consult ordered and we are waiting on this prior to d/c -ICDs as pt with hematuria  -PT/OT consult and they said [facility] placement

## 2017-06-26 NOTE — PROGRESS NOTE ADULT - PROBLEM SELECTOR PLAN 3
-c/w Flomax and Finasteride  -c/w Sheyla, Urology following -c/w Flomax and Finasteride  -c/w Carrion  - spoke to his PCP (Dr. Lynn [phone]) who said that he is planning to see him soon for outpatient void trial -c/w Flomax and Finasteride  -c/w Carrion  - spoke to his PCP (Dr. Lynn [phone]) who said that he is planning to see him soon for outpatient void trial so we will allow this to be done outpatient Delirium and metabolic encephalopathy  -Behavioral health assessment recs seen  -Haldol 0.25mg PO BID standing with Haldol 0.125 IVP q6h PRN for agitation

## 2017-06-27 LAB
ANION GAP SERPL CALC-SCNC: 12 MMOL/L — SIGNIFICANT CHANGE UP (ref 5–17)
BUN SERPL-MCNC: 20 MG/DL — SIGNIFICANT CHANGE UP (ref 7–23)
CALCIUM SERPL-MCNC: 9.5 MG/DL — SIGNIFICANT CHANGE UP (ref 8.4–10.5)
CHLORIDE SERPL-SCNC: 95 MMOL/L — LOW (ref 96–108)
CO2 SERPL-SCNC: 25 MMOL/L — SIGNIFICANT CHANGE UP (ref 22–31)
CREAT SERPL-MCNC: 0.78 MG/DL — SIGNIFICANT CHANGE UP (ref 0.5–1.3)
GLUCOSE SERPL-MCNC: 91 MG/DL — SIGNIFICANT CHANGE UP (ref 70–99)
POTASSIUM SERPL-MCNC: 4.2 MMOL/L — SIGNIFICANT CHANGE UP (ref 3.5–5.3)
POTASSIUM SERPL-SCNC: 4.2 MMOL/L — SIGNIFICANT CHANGE UP (ref 3.5–5.3)
SODIUM SERPL-SCNC: 132 MMOL/L — LOW (ref 135–145)

## 2017-06-27 PROCEDURE — 99232 SBSQ HOSP IP/OBS MODERATE 35: CPT | Mod: GC

## 2017-06-27 RX ADMIN — SIMVASTATIN 20 MILLIGRAM(S): 20 TABLET, FILM COATED ORAL at 21:29

## 2017-06-27 RX ADMIN — HALOPERIDOL DECANOATE 0.25 MILLIGRAM(S): 100 INJECTION INTRAMUSCULAR at 05:48

## 2017-06-27 RX ADMIN — Medication 75 MICROGRAM(S): at 05:47

## 2017-06-27 RX ADMIN — FINASTERIDE 5 MILLIGRAM(S): 5 TABLET, FILM COATED ORAL at 12:16

## 2017-06-27 RX ADMIN — Medication 81 MILLIGRAM(S): at 12:16

## 2017-06-27 RX ADMIN — HALOPERIDOL DECANOATE 0.25 MILLIGRAM(S): 100 INJECTION INTRAMUSCULAR at 17:18

## 2017-06-27 RX ADMIN — TAMSULOSIN HYDROCHLORIDE 0.4 MILLIGRAM(S): 0.4 CAPSULE ORAL at 21:29

## 2017-06-27 NOTE — PROGRESS NOTE ADULT - PROBLEM SELECTOR PLAN 3
Delirium and metabolic encephalopathy  -Behavioral health assessment recs seen  -Haldol 0.25mg PO BID standing with Haldol 0.125 IVP q6h PRN for agitation Delirium and metabolic encephalopathy  -Behavioral health assessment recs seen  -Haldol 0.25mg PO BID standing and d/c Haldol 0.125 IVP q6h PRN for agitation since he has had no recent agitation

## 2017-06-27 NOTE — PROGRESS NOTE ADULT - SUBJECTIVE AND OBJECTIVE BOX
R3 Progress Note    Pt currently has not been in delirum for days. Pt has not required PRN haldol medication since 6/22/17. Pt is demented, but not in delirum.     Dewayne Mendoza, PGY3 [phone]

## 2017-06-27 NOTE — PROGRESS NOTE ADULT - PROBLEM SELECTOR PLAN 2
- In setting of traumatic Carrion removal  - Hb has been stable and urine now normal in color  - patient originally had UA+ but urine culture came back negative so ceftriaxone was d/c'ed  - Pt is on ASA 81 w/ h/o CAD s/p PCI - will c/w ASA 81 for now - D/C if hematuria reappears  -Urology following, Carrion replaced in ED

## 2017-06-27 NOTE — PROGRESS NOTE ADULT - PROBLEM SELECTOR PLAN 4
-c/w Flomax and Finasteride  -c/w Carrion  - spoke to his PCP (Dr. Lynn [phone]) who said that he is planning to see him soon for outpatient void trial so we will allow this to be done outpatient

## 2017-06-27 NOTE — PROGRESS NOTE ADULT - ASSESSMENT
89M w/ PMHx of symptomatic severe AS (not operative candidate), HTN, HLD, CAD (s/p PCI), hypothyroidism, GERD, BPH, Dementia (AAOx1), and prior UTI, p/w acute agitation and hematuria after pulling Carrion out in setting of chronic progressive dementia. Patient remains demented but urine color has since normalized. 89M w/ PMHx of symptomatic severe AS (not operative candidate), HTN, HLD, CAD (s/p PCI), hypothyroidism, GERD, BPH, Dementia (AAOx1), and prior UTI, p/w acute agitation and hematuria after pulling Carrion out in setting of chronic progressive dementia.

## 2017-06-27 NOTE — PROGRESS NOTE ADULT - ATTENDING COMMENTS
d/c plannng, syncopal episode yesterday likely from AS vs medication related, no further work up required.

## 2017-06-27 NOTE — PROGRESS NOTE ADULT - SUBJECTIVE AND OBJECTIVE BOX
Patient seen and examined at bedside.  No acute overnight events.  No pain or discomfort.  Patient was AAOx1.       VITAL SIGNS:  Vital Signs Last 24 Hrs  T(C): 36.6 (26 Jun 2017 21:17), Max: 36.6 (26 Jun 2017 21:17)  T(F): 97.9 (26 Jun 2017 21:17), Max: 97.9 (26 Jun 2017 21:17)  HR: 95 (26 Jun 2017 21:17) (79 - 95)  BP: 106/70 (26 Jun 2017 21:17) (95/61 - 117/72)  BP(mean): --  RR: 18 (26 Jun 2017 21:17) (18 - 18)  SpO2: 99% (26 Jun 2017 21:17) (99% - 100%)      PHYSICAL EXAM:   GENERAL: NAD  HEAD:  Atraumatic, Normocephalic  HEART: Regular rate and rhythm; 3/6 systolic murmur   ABDOMEN: Soft, Nontender, Nondistended; Bowel sounds present  : contreras in place draining light yellow fluid  PSYCH: AAOx1 (only to self)                            12.5   8.0   )-----------( 176      ( 26 Jun 2017 14:29 )             35.4     06-27    132<L>  |  95<L>  |  20  ----------------------------<  91  4.2   |  25  |  0.78    Ca    9.5      27 Jun 2017 07:13    TPro  6.4  /  Alb  3.7  /  TBili  0.6  /  DBili  x   /  AST  20  /  ALT  11  /  AlkPhos  55  06-26        MEDICATIONS  (STANDING):  levothyroxine 75 MICROGram(s) Oral daily  finasteride 5 milliGRAM(s) Oral daily  tamsulosin 0.4 milliGRAM(s) Oral at bedtime  haloperidol     Tablet 0.25 milliGRAM(s) Oral two times a day  aspirin enteric coated 81 milliGRAM(s) Oral daily  simvastatin 20 milliGRAM(s) Oral at bedtime

## 2017-06-27 NOTE — PROGRESS NOTE ADULT - PROBLEM SELECTOR PLAN 5
- TSH 1.57, B12 408, HIV nonreactive, RPR negative  - GOC in ER, proxy Tiffanie has signed DNR/DNI  - family amenable to placement in long term facility upon d/c - PT has agreed to subacute rehab

## 2017-06-27 NOTE — PROGRESS NOTE ADULT - PROBLEM SELECTOR PLAN 1
At around 2PM yesterday the patient's family was at bedside when they called for help because the patient had an episode of syncope while sitting in his chair  - CT normal, BMP and CBC wnl, EKG showed 1st degree block with previously known L anterior fascicular block.

## 2017-06-27 NOTE — PROGRESS NOTE ADULT - PROBLEM SELECTOR PLAN 6
-no s/s of overt heart failure at this time, no significant edema on exam  -pt no longer on diuretics as outpatient  -not candidate for surgery  -currently SBP <120 so no need for diuretics or ace-i  -on ASA and statin

## 2017-06-28 PROCEDURE — 99232 SBSQ HOSP IP/OBS MODERATE 35: CPT | Mod: GC

## 2017-06-28 PROCEDURE — 71010: CPT | Mod: 26

## 2017-06-28 PROCEDURE — 74000: CPT | Mod: 26

## 2017-06-28 RX ADMIN — HALOPERIDOL DECANOATE 0.25 MILLIGRAM(S): 100 INJECTION INTRAMUSCULAR at 18:00

## 2017-06-28 RX ADMIN — HALOPERIDOL DECANOATE 0.25 MILLIGRAM(S): 100 INJECTION INTRAMUSCULAR at 05:33

## 2017-06-28 RX ADMIN — TAMSULOSIN HYDROCHLORIDE 0.4 MILLIGRAM(S): 0.4 CAPSULE ORAL at 21:51

## 2017-06-28 RX ADMIN — SIMVASTATIN 20 MILLIGRAM(S): 20 TABLET, FILM COATED ORAL at 21:51

## 2017-06-28 RX ADMIN — Medication 81 MILLIGRAM(S): at 11:47

## 2017-06-28 RX ADMIN — Medication 75 MICROGRAM(S): at 05:33

## 2017-06-28 RX ADMIN — FINASTERIDE 5 MILLIGRAM(S): 5 TABLET, FILM COATED ORAL at 11:47

## 2017-06-28 NOTE — PROGRESS NOTE ADULT - PROBLEM SELECTOR PLAN 4
-c/w Flomax and Finasteride  -c/w Carrion  - spoke to his PCP (Dr. Lynn [phone]) who said that he is planning to see him soon for outpatient void trial so we will allow this to be done outpatient

## 2017-06-28 NOTE — DIETITIAN INITIAL EVALUATION ADULT. - NS AS NUTRI INTERV MEALS SNACK
1) Continue current diet as ordered per family request. 2) Provide food preferences within diet restrictions as feasible. 3) Encourage po intake w/ snacks ordered at meals.

## 2017-06-28 NOTE — PROGRESS NOTE ADULT - PROBLEM SELECTOR PLAN 1
At around 2PM yesterday the patient's family was at bedside when they called for help because the patient had an episode of syncope while sitting in his chair  - CT normal, BMP and CBC wnl, EKG showed 1st degree block with previously known L anterior fascicular block. At around 2PM Monday the patient's family was at bedside when they called for help because the patient had an episode of syncope while sitting in his chair  - CT normal, BMP and CBC wnl, EKG showed 1st degree block with previously known L anterior fascicular block.

## 2017-06-28 NOTE — DIETITIAN INITIAL EVALUATION ADULT. - ORAL INTAKE PTA
eating less and less for the past 2 months, Pt is a fussy eater.  Son states he was counting the patient calories for the last month or so and was eating <1000calories per day. Pt likes toast and coffee in the morning and mostly soups and guacamole later on. Not eating any meats/fish anymore./poor

## 2017-06-28 NOTE — PROGRESS NOTE ADULT - PROBLEM SELECTOR PLAN 2
- In setting of traumatic Carrion removal  - Hb has been stable and urine now normal in color  - Pt is on ASA 81 w/ h/o CAD s/p PCI - will c/w ASA 81 for now - D/C if hematuria reappears  -Urology following, Carrion replaced in ED

## 2017-06-28 NOTE — DIETITIAN INITIAL EVALUATION ADULT. - OTHER INFO
Pt seen for length of stay. Son reports Pt has end stage dementia and there is no point in forcing him to eat. Family tries to entice Pt with his favorite foods or foods he recently enjoyed and Pt will refuse them. States Pt has lost a lot of weight over the past 2 months and per documentation Pt has lost an additional 10lbs since admission. Per nursing documentation, Pt eating 25-50% at meals. Denies any signs of GI distress. Son states he has mostly been eating softer foods but declined change diet to something softer because son thinks Pt would be turned off by that. Food preferences obtained, daughter bringing in food Pt enjoys. Declined Ensure supplements stating Pt wouldn't want that.

## 2017-06-28 NOTE — PROGRESS NOTE ADULT - PROBLEM SELECTOR PLAN 9
- none due to recent hematuria  DISPO: as per PT plan is to send to [facility] on Friday

## 2017-06-28 NOTE — PROGRESS NOTE ADULT - SUBJECTIVE AND OBJECTIVE BOX
Patient seen and examined at bedside.  No acute overnight events.  No pain or discomfort.  Patient was AAOx1.      VITAL SIGNS:  Vital Signs Last 24 Hrs  T(C): 36.8 (28 Jun 2017 08:07), Max: 36.8 (28 Jun 2017 08:07)  T(F): 98.2 (28 Jun 2017 08:07), Max: 98.2 (28 Jun 2017 08:07)  HR: 81 (28 Jun 2017 05:04) (81 - 98)  BP: 106/67 (28 Jun 2017 05:04) (98/64 - 129/73)  BP(mean): --  RR: 18 (28 Jun 2017 05:04) (18 - 18)  SpO2: 100% (28 Jun 2017 05:04) (97% - 100%)      PHYSICAL EXAM:   GENERAL: NAD  HEAD:  Atraumatic, Normocephalic  HEART: Regular rate and rhythm; 3/6 systolic murmur   ABDOMEN: Soft, Nontender, Nondistended; Bowel sounds present  : contreras in place draining light yellow fluid  PSYCH: AAOx1 (only to self)                          12.5   8.0   )-----------( 176      ( 26 Jun 2017 14:29 )             35.4     06-27    132<L>  |  95<L>  |  20  ----------------------------<  91  4.2   |  25  |  0.78    Ca    9.5      27 Jun 2017 07:13    TPro  6.4  /  Alb  3.7  /  TBili  0.6  /  DBili  x   /  AST  20  /  ALT  11  /  AlkPhos  55  06-26      CAPILLARY BLOOD GLUCOSE          MEDICATIONS  (STANDING):  levothyroxine 75 MICROGram(s) Oral daily  finasteride 5 milliGRAM(s) Oral daily  tamsulosin 0.4 milliGRAM(s) Oral at bedtime  haloperidol     Tablet 0.25 milliGRAM(s) Oral two times a day  aspirin enteric coated 81 milliGRAM(s) Oral daily  simvastatin 20 milliGRAM(s) Oral at bedtime

## 2017-06-28 NOTE — DIETITIAN INITIAL EVALUATION ADULT. - ENERGY NEEDS
ht: 70 inches, wt: 121.2 pounds, BMI: 17.4 kg/m2, IBW: 166 pounds (+/- 10%), 73 %IBW  Edema: none noted. Skin: intact.  Other pertinent information: 89 y/o male presented c delirium, UTI.

## 2017-06-28 NOTE — CHART NOTE - NSCHARTNOTEFT_GEN_A_CORE
Upon Nutritional Assessment by the Registered Dietitian your patient was determined to meet criteria / has evidence of the following diagnosis/diagnoses:          [ ]  Mild Protein Calorie Malnutrition        [ ]  Moderate Protein Calorie Malnutrition        [x ] Severe Protein Calorie Malnutrition        [ ] Unspecified Protein Calorie Malnutrition        [x ] Underweight / BMI <19 17.4kg/m^2        [ ] Morbid Obesity / BMI > 40      Findings as based on:  [x ] Comprehensive nutrition assessment   [ ] Nutrition Focused Physical Exam  [x ] Other: >20lb wt loss in 3 months, poor PO intake for >2 months PTA      Nutrition Plan/Recommendations:    1) Family declined nutritional supplementation states Pt wouldn't want that.  2) RD to provide food preferences obtained from family.  3) Provide total feeding assistance as needed.         PROVIDER Section:     By signing this assessment you are acknowledging and agree with the diagnosis/diagnoses assigned by the Registered Dietitian    Comments:

## 2017-06-28 NOTE — PROGRESS NOTE ADULT - PROBLEM SELECTOR PLAN 3
Delirium and metabolic encephalopathy  -Behavioral health assessment recs seen  -Haldol 0.25mg PO BID standing

## 2017-06-28 NOTE — PROGRESS NOTE ADULT - PROBLEM SELECTOR PLAN 6
-no s/s of overt heart failure at this time, no significant edema on exam  -not candidate for surgery  -currently SBP <120 so no need for diuretics or ace-i  -on ASA and statin

## 2017-06-29 LAB
ANION GAP SERPL CALC-SCNC: 12 MMOL/L — SIGNIFICANT CHANGE UP (ref 5–17)
BUN SERPL-MCNC: 22 MG/DL — SIGNIFICANT CHANGE UP (ref 7–23)
CALCIUM SERPL-MCNC: 10.1 MG/DL — SIGNIFICANT CHANGE UP (ref 8.4–10.5)
CHLORIDE SERPL-SCNC: 93 MMOL/L — LOW (ref 96–108)
CO2 SERPL-SCNC: 26 MMOL/L — SIGNIFICANT CHANGE UP (ref 22–31)
CREAT SERPL-MCNC: 0.83 MG/DL — SIGNIFICANT CHANGE UP (ref 0.5–1.3)
GLUCOSE SERPL-MCNC: 97 MG/DL — SIGNIFICANT CHANGE UP (ref 70–99)
HCT VFR BLD CALC: 35.4 % — LOW (ref 39–50)
HGB BLD-MCNC: 12.1 G/DL — LOW (ref 13–17)
MCHC RBC-ENTMCNC: 31.8 PG — SIGNIFICANT CHANGE UP (ref 27–34)
MCHC RBC-ENTMCNC: 34.1 GM/DL — SIGNIFICANT CHANGE UP (ref 32–36)
MCV RBC AUTO: 93.2 FL — SIGNIFICANT CHANGE UP (ref 80–100)
PLATELET # BLD AUTO: 173 K/UL — SIGNIFICANT CHANGE UP (ref 150–400)
POTASSIUM SERPL-MCNC: 3.9 MMOL/L — SIGNIFICANT CHANGE UP (ref 3.5–5.3)
POTASSIUM SERPL-SCNC: 3.9 MMOL/L — SIGNIFICANT CHANGE UP (ref 3.5–5.3)
RBC # BLD: 3.8 M/UL — LOW (ref 4.2–5.8)
RBC # FLD: 12.4 % — SIGNIFICANT CHANGE UP (ref 10.3–14.5)
SODIUM SERPL-SCNC: 131 MMOL/L — LOW (ref 135–145)
WBC # BLD: 6.8 K/UL — SIGNIFICANT CHANGE UP (ref 3.8–10.5)
WBC # FLD AUTO: 6.8 K/UL — SIGNIFICANT CHANGE UP (ref 3.8–10.5)

## 2017-06-29 PROCEDURE — 99232 SBSQ HOSP IP/OBS MODERATE 35: CPT | Mod: GC

## 2017-06-29 RX ADMIN — HALOPERIDOL DECANOATE 0.25 MILLIGRAM(S): 100 INJECTION INTRAMUSCULAR at 06:11

## 2017-06-29 RX ADMIN — Medication 75 MICROGRAM(S): at 06:11

## 2017-06-29 RX ADMIN — FINASTERIDE 5 MILLIGRAM(S): 5 TABLET, FILM COATED ORAL at 11:51

## 2017-06-29 RX ADMIN — Medication 81 MILLIGRAM(S): at 11:51

## 2017-06-29 RX ADMIN — HALOPERIDOL DECANOATE 0.25 MILLIGRAM(S): 100 INJECTION INTRAMUSCULAR at 17:49

## 2017-06-29 RX ADMIN — TAMSULOSIN HYDROCHLORIDE 0.4 MILLIGRAM(S): 0.4 CAPSULE ORAL at 21:29

## 2017-06-29 RX ADMIN — SIMVASTATIN 20 MILLIGRAM(S): 20 TABLET, FILM COATED ORAL at 21:29

## 2017-06-29 NOTE — PROGRESS NOTE ADULT - PROBLEM SELECTOR PLAN 5
- TSH 1.57, B12 408, HIV nonreactive, RPR negative  - GOC in ER, proxy Tiffanie has signed DNR/DNI  - family amenable to placement in long term facility upon d/c and spoke to son on 6/29 who said that they would like a palliative consult with with possible d/c to hospice so we will consult palliative - PT has agreed to subacute rehab - TSH 1.57, B12 408, HIV nonreactive, RPR negative  - GOC in ER, proxy Tiffanie has signed DNR/DNI  - PT has agreed to subacute rehab but family spoke to palliative and is planning for possible hospice

## 2017-06-29 NOTE — PROGRESS NOTE ADULT - PROBLEM SELECTOR PLAN 2
- In setting of traumatic Carrion removal  - Hb has been stable and urine now normal in color  - Pt is on ASA 81 w/ h/o CAD s/p PCI - will c/w ASA 81 for now - D/C if hematuria reappears  -Urology following, Carrion replaced in ED - In setting of traumatic Carrion removal  - Hb has been stable and urine now normal in color  - Pt is on ASA 81 w/ h/o CAD s/p PCI - will c/w ASA 81 for now - D/C if hematuria reappears  - Urology following, Carrion replaced in ED

## 2017-06-29 NOTE — PROGRESS NOTE ADULT - SUBJECTIVE AND OBJECTIVE BOX
Patient seen and examined at bedside.  No acute overnight events.  No pain or discomfort.  Patient was AAOx1.      VITAL SIGNS:  Vital Signs Last 24 Hrs  T(C): 36.5 (29 Jun 2017 05:11), Max: 36.8 (28 Jun 2017 08:07)  T(F): 97.7 (29 Jun 2017 05:11), Max: 98.2 (28 Jun 2017 08:07)  HR: 85 (29 Jun 2017 05:11) (69 - 85)  BP: 143/74 (29 Jun 2017 05:11) (96/58 - 149/85)  BP(mean): --  RR: 18 (29 Jun 2017 05:11) (18 - 24)  SpO2: 100% (29 Jun 2017 05:11) (100% - 100%)      PHYSICAL EXAM:   GENERAL: NAD  HEAD:  Atraumatic, Normocephalic  HEART: Regular rate and rhythm; 3/6 systolic murmur   ABDOMEN: Soft, Nontender, Nondistended; Bowel sounds present  : contreras in place draining light yellow fluid  PSYCH: AAOx1 (only to self)        MEDICATIONS  (STANDING):  levothyroxine 75 MICROGram(s) Oral daily  finasteride 5 milliGRAM(s) Oral daily  tamsulosin 0.4 milliGRAM(s) Oral at bedtime  haloperidol     Tablet 0.25 milliGRAM(s) Oral two times a day  aspirin enteric coated 81 milliGRAM(s) Oral daily  simvastatin 20 milliGRAM(s) Oral at bedtime

## 2017-06-29 NOTE — PROGRESS NOTE ADULT - PROBLEM SELECTOR PLAN 1
At around 2PM Monday 6/26/17 the patient's family was at bedside when they called for help because the patient had an episode of syncope while sitting in his chair  - CT normal, BMP and CBC wnl, EKG showed 1st degree block with previously known L anterior fascicular block.

## 2017-06-29 NOTE — PROGRESS NOTE ADULT - PROBLEM SELECTOR PLAN 9
- none due to recent hematuria  DISPO: as per PT plan is to send to [facility] on Friday - none due to recent hematuria  DISPO: Palliative spoke to patient - none due to recent hematuria  DISPO: Palliative spoke to patient.  Will get MOLST form tomorrow - none due to recent hematuria  DISPO: Palliative spoke to patient.  To get MOLST form

## 2017-06-29 NOTE — PROGRESS NOTE ADULT - PROBLEM SELECTOR PLAN 4
-c/w Flomax and Finasteride  -c/w Carrion  - spoke to his PCP (Dr. Lynn [phone]) who said that he is planning to see him soon for outpatient void trial so we will allow this to be done outpatient -c/w Flomax and Finasteride  -c/w Carrion  - spoke to his PCP (Dr. Lynn [phone]) who said that he is planning to see him soon for outpatient void trial so we will allow this to be done outpatient if necessary

## 2017-06-29 NOTE — PROGRESS NOTE ADULT - PROBLEM SELECTOR PLAN 8
-ICDs as pt with hematuria  -PT/OT consult and they said [facility] placement -ICDs as pt with hematuria

## 2017-06-29 NOTE — PROGRESS NOTE ADULT - ATTENDING COMMENTS
89 Y/O M w/ PMHx of symptomatic severe AS , HTN, HLD, CAD (s/p PCI), hypothyroidism,  advance  Dementia- GOC is comfort, DNR  at this time as per family and plan for [facility]. He is afebrile, no hematuria, H7H is stable, HD stable.   Mild hyponatremia – stable today is 132 .   D/C planning to [facility] Signed this note in error Signed this note in error on 6/30 ( roderick Trinidad)

## 2017-06-30 VITALS
DIASTOLIC BLOOD PRESSURE: 66 MMHG | OXYGEN SATURATION: 97 % | TEMPERATURE: 98 F | HEART RATE: 87 BPM | SYSTOLIC BLOOD PRESSURE: 122 MMHG | RESPIRATION RATE: 18 BRPM

## 2017-06-30 LAB
ANION GAP SERPL CALC-SCNC: 12 MMOL/L — SIGNIFICANT CHANGE UP (ref 5–17)
BUN SERPL-MCNC: 22 MG/DL — SIGNIFICANT CHANGE UP (ref 7–23)
CALCIUM SERPL-MCNC: 9.7 MG/DL — SIGNIFICANT CHANGE UP (ref 8.4–10.5)
CHLORIDE SERPL-SCNC: 94 MMOL/L — LOW (ref 96–108)
CO2 SERPL-SCNC: 26 MMOL/L — SIGNIFICANT CHANGE UP (ref 22–31)
CREAT SERPL-MCNC: 0.75 MG/DL — SIGNIFICANT CHANGE UP (ref 0.5–1.3)
GLUCOSE SERPL-MCNC: 88 MG/DL — SIGNIFICANT CHANGE UP (ref 70–99)
POTASSIUM SERPL-MCNC: 4.2 MMOL/L — SIGNIFICANT CHANGE UP (ref 3.5–5.3)
POTASSIUM SERPL-SCNC: 4.2 MMOL/L — SIGNIFICANT CHANGE UP (ref 3.5–5.3)
SODIUM SERPL-SCNC: 132 MMOL/L — LOW (ref 135–145)

## 2017-06-30 PROCEDURE — 83735 ASSAY OF MAGNESIUM: CPT

## 2017-06-30 PROCEDURE — 84132 ASSAY OF SERUM POTASSIUM: CPT

## 2017-06-30 PROCEDURE — 74018 RADEX ABDOMEN 1 VIEW: CPT

## 2017-06-30 PROCEDURE — 51702 INSERT TEMP BLADDER CATH: CPT

## 2017-06-30 PROCEDURE — 80053 COMPREHEN METABOLIC PANEL: CPT

## 2017-06-30 PROCEDURE — 82435 ASSAY OF BLOOD CHLORIDE: CPT

## 2017-06-30 PROCEDURE — 82803 BLOOD GASES ANY COMBINATION: CPT

## 2017-06-30 PROCEDURE — 83605 ASSAY OF LACTIC ACID: CPT

## 2017-06-30 PROCEDURE — 93005 ELECTROCARDIOGRAM TRACING: CPT

## 2017-06-30 PROCEDURE — 84443 ASSAY THYROID STIM HORMONE: CPT

## 2017-06-30 PROCEDURE — 80048 BASIC METABOLIC PNL TOTAL CA: CPT

## 2017-06-30 PROCEDURE — 70450 CT HEAD/BRAIN W/O DYE: CPT

## 2017-06-30 PROCEDURE — 85014 HEMATOCRIT: CPT

## 2017-06-30 PROCEDURE — 84100 ASSAY OF PHOSPHORUS: CPT

## 2017-06-30 PROCEDURE — 99239 HOSP IP/OBS DSCHRG MGMT >30: CPT

## 2017-06-30 PROCEDURE — 84295 ASSAY OF SERUM SODIUM: CPT

## 2017-06-30 PROCEDURE — 81001 URINALYSIS AUTO W/SCOPE: CPT

## 2017-06-30 PROCEDURE — 97162 PT EVAL MOD COMPLEX 30 MIN: CPT

## 2017-06-30 PROCEDURE — 87389 HIV-1 AG W/HIV-1&-2 AB AG IA: CPT

## 2017-06-30 PROCEDURE — 82272 OCCULT BLD FECES 1-3 TESTS: CPT

## 2017-06-30 PROCEDURE — 71045 X-RAY EXAM CHEST 1 VIEW: CPT

## 2017-06-30 PROCEDURE — 82607 VITAMIN B-12: CPT

## 2017-06-30 PROCEDURE — 86780 TREPONEMA PALLIDUM: CPT

## 2017-06-30 PROCEDURE — 85027 COMPLETE CBC AUTOMATED: CPT

## 2017-06-30 PROCEDURE — 87086 URINE CULTURE/COLONY COUNT: CPT

## 2017-06-30 PROCEDURE — 82947 ASSAY GLUCOSE BLOOD QUANT: CPT

## 2017-06-30 PROCEDURE — 82330 ASSAY OF CALCIUM: CPT

## 2017-06-30 PROCEDURE — 99285 EMERGENCY DEPT VISIT HI MDM: CPT | Mod: 25

## 2017-06-30 RX ORDER — SIMVASTATIN 20 MG/1
1 TABLET, FILM COATED ORAL
Qty: 0 | Refills: 0 | COMMUNITY

## 2017-06-30 RX ORDER — OXYCODONE HYDROCHLORIDE 5 MG/1
1 TABLET ORAL
Qty: 0 | Refills: 0 | COMMUNITY
Start: 2017-06-30

## 2017-06-30 RX ORDER — HALOPERIDOL DECANOATE 100 MG/ML
0.5 INJECTION INTRAMUSCULAR
Qty: 0 | Refills: 0 | COMMUNITY

## 2017-06-30 RX ORDER — SIMVASTATIN 20 MG/1
0.5 TABLET, FILM COATED ORAL
Qty: 0 | Refills: 0 | COMMUNITY

## 2017-06-30 RX ADMIN — Medication 75 MICROGRAM(S): at 05:51

## 2017-06-30 RX ADMIN — Medication 81 MILLIGRAM(S): at 11:26

## 2017-06-30 RX ADMIN — HALOPERIDOL DECANOATE 0.25 MILLIGRAM(S): 100 INJECTION INTRAMUSCULAR at 17:18

## 2017-06-30 RX ADMIN — FINASTERIDE 5 MILLIGRAM(S): 5 TABLET, FILM COATED ORAL at 11:26

## 2017-06-30 RX ADMIN — HALOPERIDOL DECANOATE 0.25 MILLIGRAM(S): 100 INJECTION INTRAMUSCULAR at 06:50

## 2017-06-30 NOTE — PROGRESS NOTE ADULT - PROBLEM SELECTOR PLAN 4
-c/w Flomax and Finasteride  -c/w Carrion  - spoke to his PCP (Dr. Lynn [phone]) who said that he is planning to see him soon for outpatient void trial so we will allow this to be done outpatient if necessary

## 2017-06-30 NOTE — PROGRESS NOTE ADULT - PROBLEM SELECTOR PLAN 5
- TSH 1.57, B12 408, HIV nonreactive, RPR negative  - GOC in ER, proxy Tiffanie has signed DNR/DNI  - PT has agreed to subacute rehab but family spoke to palliative and is planning for possible hospice

## 2017-06-30 NOTE — PROGRESS NOTE ADULT - ATTENDING COMMENTS
89 Y/O M w/ PMHx of symptomatic severe AS , HTN, HLD, CAD (s/p PCI), hypothyroidism,  advance  Dementia- GOC is comfort, DNR  at this time as per family and plan for [facility]. He is afebrile, no hematuria, H7H is stable, HD stable.   Mild hyponatremia – stable today is 132 .   D/C planning to [facility]

## 2017-06-30 NOTE — PROGRESS NOTE ADULT - PROVIDER SPECIALTY LIST ADULT
Internal Medicine
Urology
Internal Medicine

## 2017-06-30 NOTE — PROGRESS NOTE ADULT - SUBJECTIVE AND OBJECTIVE BOX
Patient seen and examined at bedside.  No acute overnight events.  No pain or discomfort.  Patient was AAOx1.    VITAL SIGNS:  Vital Signs Last 24 Hrs  T(C): 36.6 (30 Jun 2017 04:54), Max: 36.6 (29 Jun 2017 15:02)  T(F): 97.9 (30 Jun 2017 04:54), Max: 97.9 (29 Jun 2017 15:02)  HR: 87 (30 Jun 2017 04:54) (82 - 96)  BP: 114/75 (30 Jun 2017 04:54) (101/64 - 133/71)  BP(mean): --  RR: 18 (30 Jun 2017 04:54) (18 - 20)  SpO2: 98% (30 Jun 2017 04:54) (98% - 100%)    PE:  GENERAL: NAD  HEAD:  Atraumatic, Normocephalic  HEART: Regular rate and rhythm; 3/6 systolic murmur   ABDOMEN: Soft, Nontender, Nondistended; Bowel sounds present  : contreras in place draining light yellow fluid  PSYCH: AAOx1 (only to self)                          12.1   6.8   )-----------( 173      ( 29 Jun 2017 07:10 )             35.4     06-30    132<L>  |  94<L>  |  22  ----------------------------<  88  4.2   |  26  |  0.75    Ca    9.7      30 Jun 2017 07:06        MEDICATIONS  (STANDING):  levothyroxine 75 MICROGram(s) Oral daily  finasteride 5 milliGRAM(s) Oral daily  tamsulosin 0.4 milliGRAM(s) Oral at bedtime  haloperidol     Tablet 0.25 milliGRAM(s) Oral two times a day  aspirin enteric coated 81 milliGRAM(s) Oral daily  simvastatin 20 milliGRAM(s) Oral at bedtime

## 2017-06-30 NOTE — PROGRESS NOTE ADULT - PROBLEM SELECTOR PLAN 1
- At around 2PM Monday 6/26/17 the patient's family was at bedside when they called for help because the patient had an episode of syncope while sitting in his chair  - CT normal, BMP and CBC wnl, EKG showed 1st degree block with previously known L anterior fascicular block.

## 2017-06-30 NOTE — PROGRESS NOTE ADULT - PROBLEM SELECTOR PLAN 2
- In setting of traumatic Carrion removal  - Hb has been stable and urine now normal in color  - Pt is on ASA 81 w/ h/o CAD s/p PCI - will c/w ASA 81 for now - D/C if hematuria reappears  - Urology following, Carrion replaced in ED

## 2019-06-18 NOTE — PROGRESS NOTE BEHAVIORAL HEALTH - AFFECT CONGRUENCE
Report called to [place] at [facility]. Opportunity for questions/clarification provided. Not congruent

## 2022-03-14 NOTE — PROGRESS NOTE BEHAVIORAL HEALTH - NS ED BHA MED ROS CONSTITUTIONAL SYMPTOMS
Patient notified via [employer]:  Prescriptions sent to Pharmacy on 3/12/2022. Receipt confirmed by pharmacy (3/12/2022 10:34 AM)   Unable to assess

## 2022-04-15 NOTE — PROGRESS NOTE ADULT - PROBLEM SELECTOR PLAN 7
Patient notified of chest xray below and confirmed that he did speak with another nurse at Dr. Thayer's in regards to his blood sugars being too high for surgery. Patient stated that he did not recall getting a call from Dr. Rahman's office but stated that he would try reaching out to them today to discuss postponing and rescheduling surgery. No other questions or concerns at this time.      -c/w Synthroid  -TSH on 6/23 found to be 1.57

## 2023-05-31 NOTE — BEHAVIORAL HEALTH ASSESSMENT NOTE - MODIFICATIONS
None Posterior Auricular Interpolation Flap Text: A decision was made to reconstruct the defect utilizing an interpolation axial flap and a staged reconstruction.  A telfa template was made of the defect.  This telfa template was then used to outline the posterior auricular interpolation flap.  The donor area for the pedicle flap was then injected with anesthesia.  The flap was excised through the skin and subcutaneous tissue down to the layer of the underlying musculature.  The pedicle flap was carefully excised within this deep plane to maintain its blood supply.  The edges of the donor site were undermined.   The donor site was closed in a primary fashion.  The pedicle was then rotated into position and sutured.  Once the tube was sutured into place, adequate blood supply was confirmed with blanching and refill.  The pedicle was then wrapped with xeroform gauze and dressed appropriately with a telfa and gauze bandage to ensure continued blood supply and protect the attached pedicle.

## 2023-10-23 NOTE — H&P ADULT - PROBLEM SELECTOR PROBLEM 5
Patient was a little nervous when he arrived today but said he calmed down recheck blood pressure is 122/70 well-controlled and no changes will be made.   Aortic valve stenosis, unspecified etiology Dementia

## 2024-06-29 NOTE — PROGRESS NOTE BEHAVIORAL HEALTH - NS ED BHA MSE SPEECH SPONTANEITY
"Regarding: post op /body shakes/back pain  ----- Message from Elsy DOLL sent at 2024  7:13 AM EDT -----  Pt stated, \"I just had a  on Tuesday and I am home from the hospital. Last night I have been having body shakes and pain from the middle of my back.\"    " Increased latency

## 2024-10-17 NOTE — ED PROVIDER NOTE - PMH
What Is The Reason For Today's Visit?: Full Body Skin Examination
Aortic stenosis    BPH (benign prostatic hyperplasia)    Elevated cholesterol    Hypertension

## 2024-11-27 NOTE — ED PROVIDER NOTE - PSH
Adult Hypothyroidism    CAD (Coronary Artery Disease)    Coronary Stent    History of Cholecystectomy    HTN (Hypertension)    S/P Laparoscopic Cholecystectomy (4) no limitation
